# Patient Record
Sex: FEMALE | Race: WHITE | NOT HISPANIC OR LATINO | Employment: OTHER | URBAN - METROPOLITAN AREA
[De-identification: names, ages, dates, MRNs, and addresses within clinical notes are randomized per-mention and may not be internally consistent; named-entity substitution may affect disease eponyms.]

---

## 2023-09-25 ENCOUNTER — HOSPITAL ENCOUNTER (OUTPATIENT)
Dept: DATA CONVERSION | Facility: HOSPITAL | Age: 88
End: 2023-09-25
Attending: STUDENT IN AN ORGANIZED HEALTH CARE EDUCATION/TRAINING PROGRAM | Admitting: STUDENT IN AN ORGANIZED HEALTH CARE EDUCATION/TRAINING PROGRAM
Payer: COMMERCIAL

## 2023-09-25 DIAGNOSIS — C43.62 MALIGNANT MELANOMA OF LEFT UPPER LIMB, INCLUDING SHOULDER (MULTI): ICD-10-CM

## 2023-09-30 RX ORDER — LEVOTHYROXINE SODIUM 25 UG/1
25 TABLET ORAL
COMMUNITY
Start: 2022-10-05

## 2023-09-30 RX ORDER — OXYBUTYNIN CHLORIDE 5 MG/1
5 TABLET ORAL 2 TIMES DAILY
COMMUNITY
Start: 2023-09-05

## 2023-09-30 RX ORDER — ALENDRONATE SODIUM 70 MG/1
70 TABLET ORAL
COMMUNITY
Start: 2022-10-04

## 2023-09-30 RX ORDER — HYDROGEN PEROXIDE 3 %
20 SOLUTION, NON-ORAL MISCELLANEOUS DAILY
COMMUNITY
Start: 2022-12-21

## 2023-09-30 RX ORDER — ATORVASTATIN CALCIUM 20 MG/1
20 TABLET, FILM COATED ORAL DAILY
COMMUNITY
Start: 2023-09-05

## 2023-09-30 NOTE — H&P
History & Physical Reviewed:   I have reviewed the History and Physical dated:  25-Sep-2023   History and Physical reviewed and relevant findings noted. Patient examined to review pertinent physical  findings.: No significant changes   Home Medications Reviewed: no changes noted   Allergies Reviewed: no changes noted       ERAS (Enhanced Recovery After Surgery):  ·  ERAS Patient: no     Consent:   COVID-19 Consent:  ·  COVID-19 Risk Consent Surgeon has reviewed key risks related to the risk of lazara COVID-19 and if they contract COVID-19 what the risks are.       Electronic Signatures:  Romario Bell)  (Signed 25-Sep-2023 07:14)   Authored: History & Physical Reviewed, ERAS, Consent,  Note Completion      Last Updated: 25-Sep-2023 07:14 by Romario Bell)

## 2023-10-01 NOTE — OP NOTE
PROCEDURE DETAILS    Preoperative Diagnosis:  melanoma left arm  Postoperative Diagnosis:  melanoma left arm  Surgeon: Romario Bell  Resident/Fellow/Other Assistant: Zain Howard    Procedure:  1. WIDE LOCAL EXCISION LEFT ARM, SENTINEL NODE BIOPSY, NEOPROBE    Anesthesia: No anesthesiologist associated with this case  Estimated Blood Loss: 5  Findings: 3 lymph nodes  Specimens(s) Collected: yes,           Operative Report:   Indications for surgery: The patient was recently diagnosed with a 1.3 mm melanoma of the left upper arm.  This biopsy had positive margins and on physical  exam it was clearly a T4 lesion.  A preoperative PET scan was obtained and there was faint FDG activity in the left axillary basin but not convincing for metastatic disease.  After discussing the risks and benefits of wide local excision with sentinel  lymph node biopsy the patient elected to proceed.    Details of procedure: The patient arrived at [facility] on 9/25/2023 for the aforementioned procedure. Consent was obtained, history and physical performed, and questions were answered. The patient was moved into the operating room and  induced under anesthesia. A timeout was performed prior to surgery.    For the wide local excision, the left arm was prepped and draped in the usual sterile fashion. A 2 cm margin was drawn about the lesion and this was extended into a 3:1 elliptical incision along natural body lines to facilitate a tension-free closure.  Local anesthetic was injected and an incision was made along this ellipse. Electrocautery was used to dissected down to the muscular fascia and the specimen was then removed and marked with a short stitch superior and a long stitch lateral with a final  dimension of 6 x 16 cm. This was sent for permanent pathology. Circumferential soft tissue flaps were created to facilitate closure. The wound was irrigated and hemostasis was achieved. The wound was then closed  in a complex multilayer fashion using deep  2-0 Vicryl [id number], interrupted 3-0 Vicryl deep dermals, and a running 4-0 Prolene suture. The skin was dressed with Dermabond.    For the sentinel lymph node biopsy, the left axilla was prepped and draped in the usual sterile fashion after verifying radiotracer presence in this basin with the neoprobe. A 3 cm incision was made over the radioactivity and dissection was carried out  with electrocautery to identify the sentinel node. This was removed using clips and suture as needed to control blood vessels and lymphatics. A total of 3 lymph nodes was obtained and sent for permanent pathology. The wound was then irrigated and hemostasis  achieved. This was closed in a multilayer fashion using interrupted deep 3-0 Vicryl in a running subcuticular 4-0 Monocryl. The skin was dressed with Dermabond.    The patient was awoken and returned to the PACU in anticipation of discharge home. I was present scrubbed and directed all operative decision-making.  Note Recipients: [facility] Synoptic Op Report:  Primary Cutaneous Melanoma  Operation performed with curative intent: yes   1.3 mm  Clinical margin width: 2 cm   Depth of excision: full-thickness skin/subcutaneous tissue down to fascia (melanoma)                    Attestation:   Note Completion:  Attending Attestation I performed the procedure without a resident         Electronic Signatures:  Romario Bell)  (Signed 25-Sep-2023 11:57)   Authored: Post-Operative Note, Chart Review, Note Completion      Last Updated: 25-Sep-2023 11:57 by Romario Bell)

## 2023-10-02 LAB
ATRIAL RATE: 52 BPM
COMPLETE PATHOLOGY REPORT: NORMAL
CONVERTED ADDENDUM COMMENT 2: NORMAL
CONVERTED CLINICAL DIAGNOSIS-HISTORY: NORMAL
CONVERTED DIAGNOSIS COMMENT: NORMAL
CONVERTED FINAL DIAGNOSIS: NORMAL
CONVERTED FINAL REPORT PDF LINK TO COPY AND PASTE: NORMAL
CONVERTED GROSS DESCRIPTION: NORMAL
CONVERTED MICROSCOPIC DESCRIPTION: NORMAL
CONVERTED SYNOPTIC DIAGNOSIS: NORMAL
P AXIS: 50 DEGREES
P OFFSET: 158 MS
P ONSET: 104 MS
PR INTERVAL: 246 MS
Q ONSET: 227 MS
QRS COUNT: 9 BEATS
QRS DURATION: 70 MS
QT INTERVAL: 432 MS
QTC CALCULATION(BAZETT): 401 MS
QTC FREDERICIA: 411 MS
R AXIS: 4 DEGREES
T AXIS: 34 DEGREES
T OFFSET: 443 MS
VENTRICULAR RATE: 52 BPM

## 2023-10-04 DIAGNOSIS — T81.49XA WOUND INFECTION AFTER SURGERY: Primary | ICD-10-CM

## 2023-10-04 RX ORDER — CEPHALEXIN 500 MG/1
500 CAPSULE ORAL 2 TIMES DAILY
Qty: 28 CAPSULE | Refills: 0 | Status: SHIPPED | OUTPATIENT
Start: 2023-10-04 | End: 2023-10-12 | Stop reason: HOSPADM

## 2023-10-04 RX ORDER — DOXYCYCLINE 100 MG/1
100 CAPSULE ORAL 2 TIMES DAILY
Qty: 28 CAPSULE | Refills: 0 | Status: SHIPPED | OUTPATIENT
Start: 2023-10-04 | End: 2023-10-12 | Stop reason: HOSPADM

## 2023-10-05 ENCOUNTER — APPOINTMENT (OUTPATIENT)
Dept: DERMATOLOGY | Facility: CLINIC | Age: 88
End: 2023-10-05
Payer: COMMERCIAL

## 2023-10-08 DIAGNOSIS — C43.9 MELANOMA OF SKIN (MULTI): Primary | ICD-10-CM

## 2023-10-08 NOTE — TUMOR BOARD NOTE
General Patient Information  Name:  Nayely Gr  Evaluation #:  2  Conference Date:  10-9-2023  YOB: 1935  MRN:  [id number]  Program Physician(s):  Tamir Hudson  Referring Physician(s):  Romario Kincaid      Summary   Stage:  pIIID ([id number])   Melanoma 5 year survival: 32%    Assessment:  Left upper arm with a non-ulcerated primary dermal melanoma, Breslow: at least 1.3 mm, broadly transected on the deep margin; the lack of epidermal attachment raises the possibility of a metastatic or recurrent melanoma.  PET/CT withmildly hypermetabolic left axillary lymphadenopathy concerning for ansley disease involvement.  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    Recommendation:  Imaging to rule out metastatic disease. Consider complete lymph node dissection vs. close follow-up with serial ultrasounds. Refer to medical oncology.    Review Multidisciplinary Cutaneous Oncology Conference recommendation with patient.  Continue routine follow up and total body skin exams with [facility].    Follow Up:  Romario Kincaid      History and Physical Exam  Dermatologic History:   88 y.o. female with a biopsy of the left upper arm on 8- showing a non-ulcerated melanoma, primary dermal type, Breslow: at least 1.3 mm, broadly transected on the deep margin; the lack of epidermal attachment raises the possibility of a metastatic or recurrent melanoma.  PET/CT on 9- showing an intensely hypermetabolic soft tissue lesion involving the anterior left arm consistent with a neoplastic process, mildly hypermetabolic left axillary lymphadenopathy concerning for ansley disease involvement, and no evidence of additional focal hypermetabolic lesion to suggest  additional neoplastic foci.  S/p on 9- WLE with 2 cm margins showing an ulcerated malignant melanoma, primary dermal vs nodular type, Breslow thickness 9.5 mm with in transit metastasis, inked margins free in planes of sections examined, and left axillary SLNB showing focal SOX-10 positive staining, greatest deposit size of 0.02 mm (2/3).    Past Medical History:  PAD (previously on plavix for stent in left leg)  HLD  Osteoporosis  GERD  Hypothyroid    Family History of DNS/MM:  Unknown    Skin:  1-2 cm fungating moist lesion half pigmented left upper arm    Lymphatic:  No lymphadenopathy      Pathology  Name JAIDEN PATEL                                                                                                   Accession #: [id number]            Pathologist:                   ASHLEIGH OSBORN MD  Date of Procedure:    9/25/2023  Date Received:          9/26/2023  Date Reported           10/2/2023  Submitting Physician:   VANESSA GILL MD  Location:                    [facility]  Other External #    Procedures/Addenda Present                                                               FINAL DIAGNOSIS  A.  NODE, LEFT AXILLARY SENTINEL LYMPH NODE #1, BIOPSY:  FOCAL SOX-10 POSITIVE STAINING, SEE NOTE.    Note: Microscopic examination reveals a lymph node. In the subcapsular space  there are two couplets of SOX-10 positive cells are not seen on the Melan-A or  HMB45 stain. All control slides stain appropriately. The primary melanoma in  [id number] was reviewed and the cytology is similar. A metastatic melanoma in 1/1  lymph node without extracapsular extension and greatest deposit size of 0.02 mm  is favored.      B.  NODE, LEFT AXILLARY SENTINEL LYMPH NODE #2, BIOPSY:  FOCAL SOX-10 POSITIVE STAINING, SEE NOTE.    Note: Microscopic examination reveals an occasional SOX-10 positive cell in the  subcapsular space compared to the primary melanoma in [id number]. They are not  seen on the  Melan-A or HMB45 stain. All control slides stain appropriately. The  findings are suspicious for metastatic malignant melanoma in 1/1 lymph node  with a greatest deposit size of 0.01 mm.      C.  NODE, LEFT AXILLARY SENTINEL LYMPH NODE #3, BIOPSY:  BENIGN LYMPH NODE.    D.  SKIN, WIDE LOCAL EXCISION LEFT UPPER ARM 6x16CM:  ULCERATED MALIGNANT MELANOMA, BRESLOW THICKNESS 9.5 MM WITH IN TRANSIT  METASTASIS, INKED MARGINS FREE IN PLANES OF SECTIONS EXAMINED, SEE NOTE.    Note: Microscopic examination reveals a specimen that extends into the  subcutaneous fat. There is an ulcer and there are atypical epithelioid and  spindled cells in the dermis. In slide D15 away from the main tumor body and in  slide D22 in the subcutaneous fat there are atypical spindled cells. The tumor  is present in slides D12 through 19 predominantly. In slide D22 the atypical  spindle cells stain with antibodies against SOX-10. All control slides stain  appropriately.                  **Electronically Signed Out by ASHLEIGH OSBORN M.D.**                                                                                                                                    CASE SUMMARY REPORT  D.  SKIN, WIDE LOCAL EXCISION LEFT UPPER ARM 6x16CM:       SPECIMEN  Procedure:      Re-excision       [place] node(s) biopsy  Specimen Laterality:      Left  TUMOR  Tumor Site:      Skin of upper limb and shoulder: Left upper arm   Histologic Type:         Primary dermal vs Nodular   Maximum Tumor (Breslow) Thickness (Millimeters):         9.5 mm   Macroscopic Satellite Nodule(s):        Not identified   Ulceration:        Present          Extent of Ulceration (Millimeters): 15 mm   Anatomic (Aquiles) Level:        V (Melanoma invades subcutis)   Mitotic Rate:         15 mitoses per mm2   Microsatellite(s):        Present   Lymphovascular Invasion:        Not identified   Neurotropism:        Not identified   Tumor-Infiltrating Lymphocytes:        Present,  nonbrisk   Tumor Regression:        Not identified  MARGINS   Margin Status for Invasive Melanoma:        All margins negative for invasive  melanoma          Closest Margin Location(s) to Invasive Melanoma: Medial     Distance from Invasive Melanoma to Peripheral Margin:            8 mm     Distance from Invasive Melanoma to Deep Margin:           Cannot be determined: The fat had to be shaved off of the epidermis  and dermis due to the thickness of the specimen.  REGIONAL LYMPH NODES   Regional Lymph Node Status:          Tumor present in regional lymph node(s)       Total Number of Lymph Nodes with Tumor:             2       Number of [place] Lymph Nodes with Tumor:             2       [place] Site(s) with Tumor:            Intramedullary       Size of Largest [place] Node Metastatic Deposit:              0.02 mm       Size of Largest [place] Metastatic Deposit:             0.02 mm       Extranodal Extension:            Present       Matted Nodes:            Not identified     Total Number of Lymph Nodes Examined:           3     Number of [place] Nodes Examined:           3  DISTANT METASTASIS  PATHOLOGIC STAGE CLASSIFICATION (pTNM, AJCC 8th Edition)  Reporting of pT, pN, and (when applicable) pM categories is based on  information available to the pathologist at the time the report is issued. As  per the AJCC (Chapter 1, 8th Ed.) it is the managing physician's responsibility  to establish the final pathologic stage based upon all pertinent information,  including but potentially not limited to this pathology report.   pT Category:        pT4b   pN Category:        pN2c  ADDITIONAL FINDINGS  Additional Findings:       None  ADDITIONAL TESTING  Representative Blocks:      Normal Block: Not applicable       Tumor Block: D12 through 19                                                                                 Electronically Signed Out By ASHLEIGH OSBORN MD/[facility]  Addendum/Procedures:  Addendum     Date Ordered:      10/4/2023     Status:  Signed Out       Date Complete:     10/4/2023             Date Reported:     10/4/2023                 Addendum Comment  The atypical spindle cells do not stain with antibodies against CD31. All  control slides stain appropriately.  Electronically Signed Out By ASHLEIGH OSBORN MD/[facility]       Microscopic Description:  C. Microscopic examination reveals a lymph node with normal architecture.  No  melanoma is seen on H and E staining.  Melan-A, SOX-10, and HMB45 stains are  unremarkable. All control slides stain appropriately.        ___________________________________________________________________________________________________    Report Status: Final  Name: JAIDEN PATEL  Accession #: [id number]  Pathologist: ASHLEIGH OSBORN MD  Date of Procedure: 8/28/2023  Date Received: 8/28/2023  Date Reported 8/29/2023  Submitting Physician: VANESSA GILL MD  Location: [facility]  Copy To/Referring/Attending:  MD PABLTIO FRANCOIS      FINAL DIAGNOSIS  4 SLIDES, [facility] #[id number] [C] (BX:  08/15/2023)    A. SKIN, RIGHT CHEEK:  SLIDES NOT RECEIVED    B. SKIN, RIGHT ALAR CREASE:  SLIDES NOT RECEIVED    C. SKIN, LEFT UPPER ARM, SHAVE BIOPSY:    MALIGNANT MELANOMA, BRESLOW THICKNESS AT LEAST 1.3 MM, PRESENT ON THE DEEP AND  PERIPHERAL MARGIN, SEE NOTE.    Note: Microscopic examination reveals a specimen that extends into the mid  reticular dermis. There are severely atypical epithelioid cells with occasional  multinucleate cells in the dermis. They do not stain with antibodies against  CK5/6 or CK SOLEDAD. They stain strongly with antibodies against SOX-10.    The lack of epidermal attachment raises the possibility of a metastatic or  recurrent melanoma. If it is a primary melanoma it would have features as  outlined in the synoptic report.    **Electronically Signed Out by KORD S. HONDA, M.D.**      CANCER SUMMARY REPORT  A. 4 SLIDES, [facility] #[id number] [C] (BX:  08/15/2023):    SPECIMEN  Procedure: Biopsy, shave  Specimen Laterality: Left    TUMOR  Tumor Site: Skin of upper limb and shoulder: Left upper arm  Histologic Type: Primary dermal  Maximum Tumor (Breslow) Thickness (Millimeters): At least: 1.3 mm  Broadly transected on the deep margin.  Ulceration: Not identified  Anatomic (Aquiles) Level: At least level: IV  Broadly transected on the deep margin.  Mitotic Rate: 3 mitoses per mm2  Microsatellite(s): Not identified  Lymphovascular Invasion: Not identified  Neurotropism: Not identified  Tumor-Infiltrating Lymphocytes: Present, nonbrisk  Tumor Regression: Not identified    MARGINS  Margin Status for Invasive Melanoma: Invasive melanoma present at  margin  Margin(s) Involved by Invasive Melanoma: Peripheral  Deep: At the periphery.    PATHOLOGIC STAGE CLASSIFICATION (pTNM, AJCC 8th Edition)  pT Category: pT2a    ADDITIONAL FINDINGS  Additional Findings: None    ADDITIONAL TESTING  Representative Blocks: Normal Block: None  Tumor Block: C1    Electronically Signed Out By ASHLEIGH OSBORN MD/[facility]      Radiology  Interpreted By:  LEILANI PARIS MD and KALPESH PRINCE MD  MRN: [id number]  Patient Name: JAIDEN PATEL     STUDY:  PET/CT MELANOMA INITIAL STAGING;  9/20/2023 11:21 am     INDICATION:  Patient with malignant mnelanoma of left arm needs whole body PET due  to pathology report finding this concerning for metastatic lesion and  high risk stage 2- possible stage 3 melanoma pt will likely need  adjuvant therapy need to access further  C43.62: Malignant melanoma  of left upper arm.     COMPARISON:  None.     ACCESSION NUMBER(S):  [id number]     ORDERING CLINICIAN:  VANESSA GILL     TECHNIQUE:  DIVISION OF NUCLEAR MEDICINE  POSITRON EMISSION TOMOGRAPHY (PET-CT)     The patient received an intravenous dose of 12.7 mCi of Fluorine-18  fluorodeoxyglucose (FDG).  The patient was placed in a dark quiet  room. Positron emission tomographic (PET)  images from skull vertex to  the feet were then acquired after a one hour delay. Also acquired was  a contemporaneous low dose non-contrast CT scan performed for  attenuation correction of PET images and anatomic localization.  The  PET and CT images were digitally fused for display.  All images were  acquired on a combined PET-CT scanner unit.  Some areas of FDG  accumulation may be described in standardized uptake value (SUV)  units.     CODING:  Initial Treatment Strategy (PI)     CALIBRATION:  Dose Injection-to-Scan Interval (mins): 58 min  Mediastinal bloodpool SUV (normal 1.5-2.5): 1.7  Blood glucose: 119 mg/dL     FINDINGS:  HEAD AND NECK:  No evidence of focal hypermetabolic lesion in the brain parenchyma,  noting that evaluation is limited because of the expected physiologic  diffuse FDG uptake in the brain.  No focal hypermetabolic soft tissue lesion is seen in the neck.  No hypermetabolic cervical lymphadenopathy is present.     CHEST:  No focal hypermetabolic lesion is seen in the lung parenchyma. There  are multiple calcified lung nodules throughout the lungs bilaterally  most notable within the left perihilar and right apical lung without  increased metabolic activity.  There are two mildly hypermetabolic left axillary lymph nodes with  maximum SUV of 1.8. No additional intrathoracic or axillary  hypermetabolic lymphadenopathy.     ABDOMEN AND PELVIS:  No hypermetabolic soft tissue lesion is present in the abdomen and  pelvis. There is a photopenic lesion within hepatic segment 7 which  measures up to 1.9 cm which is incompletely characterized on current  examination but favored to represent benign process such as hepatic  cyst or hemangioma.  No evidence of hypermetabolic lymphadenopathy.  Physiologic radiotracer uptake is present in the liver and spleen  with excretion into the bowel loops and the genitourinary tract.     MUSCULOSKELETAL/EXTREMITIES:  No focal hypermetabolic lesion is seen in the  axial or appendicular  skeleton to suggest osseous metastasis.  Soft tissue lesion involving the cutaneous and subcutaneous anterior  left arm with maximum SUV of 11.0. No additional hypermetabolic soft  tissue lesions.     IMPRESSION:  1.  Intensely hypermetabolic soft tissue lesion involving the  anterior left arm consistent with a neoplastic process.  2. Mildly hypermetabolic left axillary lymphadenopathy concerning for  ansley disease involvement.  3. No evidence of additional focal hypermetabolic lesion to suggest  additional neoplastic foci.

## 2023-10-09 ENCOUNTER — HOSPITAL ENCOUNTER (OUTPATIENT)
Facility: HOSPITAL | Age: 88
Discharge: HOME | DRG: 603 | End: 2023-10-12
Attending: EMERGENCY MEDICINE | Admitting: INTERNAL MEDICINE
Payer: COMMERCIAL

## 2023-10-09 ENCOUNTER — TUMOR BOARD CONFERENCE (OUTPATIENT)
Dept: HEMATOLOGY/ONCOLOGY | Facility: HOSPITAL | Age: 88
End: 2023-10-09
Payer: COMMERCIAL

## 2023-10-09 DIAGNOSIS — T81.31XA DEHISCENCE OF OPERATIVE WOUND, INITIAL ENCOUNTER: ICD-10-CM

## 2023-10-09 DIAGNOSIS — L03.90 CELLULITIS: Primary | ICD-10-CM

## 2023-10-09 DIAGNOSIS — L03.114 CELLULITIS OF LEFT UPPER EXTREMITY: ICD-10-CM

## 2023-10-09 PROCEDURE — 87040 BLOOD CULTURE FOR BACTERIA: CPT | Mod: STJLAB

## 2023-10-09 PROCEDURE — 36415 COLL VENOUS BLD VENIPUNCTURE: CPT | Mod: STJLAB

## 2023-10-09 PROCEDURE — 36415 COLL VENOUS BLD VENIPUNCTURE: CPT

## 2023-10-09 PROCEDURE — 2500000004 HC RX 250 GENERAL PHARMACY W/ HCPCS (ALT 636 FOR OP/ED)

## 2023-10-09 PROCEDURE — 99285 EMERGENCY DEPT VISIT HI MDM: CPT | Performed by: EMERGENCY MEDICINE

## 2023-10-09 PROCEDURE — 87040 BLOOD CULTURE FOR BACTERIA: CPT

## 2023-10-09 PROCEDURE — 99285 EMERGENCY DEPT VISIT HI MDM: CPT | Mod: 25 | Performed by: EMERGENCY MEDICINE

## 2023-10-09 PROCEDURE — 87075 CULTR BACTERIA EXCEPT BLOOD: CPT

## 2023-10-09 RX ORDER — VANCOMYCIN HYDROCHLORIDE 1 G/200ML
1000 INJECTION, SOLUTION INTRAVENOUS ONCE
Status: COMPLETED | OUTPATIENT
Start: 2023-10-09 | End: 2023-10-10

## 2023-10-09 RX ADMIN — VANCOMYCIN HYDROCHLORIDE 1000 MG: 1 INJECTION, SOLUTION INTRAVENOUS at 23:25

## 2023-10-09 ASSESSMENT — LIFESTYLE VARIABLES
HAVE PEOPLE ANNOYED YOU BY CRITICIZING YOUR DRINKING: NO
EVER HAD A DRINK FIRST THING IN THE MORNING TO STEADY YOUR NERVES TO GET RID OF A HANGOVER: NO
EVER FELT BAD OR GUILTY ABOUT YOUR DRINKING: NO
HAVE YOU EVER FELT YOU SHOULD CUT DOWN ON YOUR DRINKING: NO

## 2023-10-09 ASSESSMENT — PAIN SCALES - GENERAL: PAINLEVEL_OUTOF10: 0 - NO PAIN

## 2023-10-09 ASSESSMENT — COLUMBIA-SUICIDE SEVERITY RATING SCALE - C-SSRS
6. HAVE YOU EVER DONE ANYTHING, STARTED TO DO ANYTHING, OR PREPARED TO DO ANYTHING TO END YOUR LIFE?: NO
2. HAVE YOU ACTUALLY HAD ANY THOUGHTS OF KILLING YOURSELF?: NO
1. IN THE PAST MONTH, HAVE YOU WISHED YOU WERE DEAD OR WISHED YOU COULD GO TO SLEEP AND NOT WAKE UP?: NO

## 2023-10-09 ASSESSMENT — PAIN - FUNCTIONAL ASSESSMENT: PAIN_FUNCTIONAL_ASSESSMENT: 0-10

## 2023-10-10 ENCOUNTER — APPOINTMENT (OUTPATIENT)
Dept: SURGICAL ONCOLOGY | Facility: CLINIC | Age: 88
End: 2023-10-10
Payer: COMMERCIAL

## 2023-10-10 PROBLEM — L03.90 CELLULITIS: Status: ACTIVE | Noted: 2023-10-10

## 2023-10-10 PROBLEM — E87.6 HYPOKALEMIA: Status: ACTIVE | Noted: 2023-10-10

## 2023-10-10 PROBLEM — L03.90 CELLULITIS: Status: RESOLVED | Noted: 2023-10-10 | Resolved: 2023-10-10

## 2023-10-10 PROBLEM — T81.31XA DEHISCENCE OF INCISION: Status: ACTIVE | Noted: 2023-10-10

## 2023-10-10 PROBLEM — L03.114 CELLULITIS OF LEFT UPPER EXTREMITY: Status: ACTIVE | Noted: 2023-10-10

## 2023-10-10 PROBLEM — L03.90 CELLULITIS: Chronic | Status: RESOLVED | Noted: 2023-10-10 | Resolved: 2023-10-10

## 2023-10-10 PROBLEM — C43.9 MELANOMA (MULTI): Status: ACTIVE | Noted: 2023-10-10

## 2023-10-10 LAB
ALBUMIN SERPL BCP-MCNC: 4 G/DL (ref 3.4–5)
ALP SERPL-CCNC: 63 U/L (ref 33–136)
ALT SERPL W P-5'-P-CCNC: 7 U/L (ref 7–45)
ANION GAP SERPL CALC-SCNC: 13 MMOL/L (ref 10–20)
ANION GAP SERPL CALC-SCNC: 14 MMOL/L (ref 10–20)
AST SERPL W P-5'-P-CCNC: 11 U/L (ref 9–39)
BILIRUB SERPL-MCNC: 0.5 MG/DL (ref 0–1.2)
BUN SERPL-MCNC: 12 MG/DL (ref 6–23)
BUN SERPL-MCNC: 17 MG/DL (ref 6–23)
CALCIUM SERPL-MCNC: 9.1 MG/DL (ref 8.6–10.3)
CALCIUM SERPL-MCNC: 9.5 MG/DL (ref 8.6–10.3)
CHLORIDE SERPL-SCNC: 104 MMOL/L (ref 98–107)
CHLORIDE SERPL-SCNC: 106 MMOL/L (ref 98–107)
CO2 SERPL-SCNC: 23 MMOL/L (ref 21–32)
CO2 SERPL-SCNC: 25 MMOL/L (ref 21–32)
CREAT SERPL-MCNC: 0.56 MG/DL (ref 0.5–1.05)
CREAT SERPL-MCNC: 0.82 MG/DL (ref 0.5–1.05)
ERYTHROCYTE [DISTWIDTH] IN BLOOD BY AUTOMATED COUNT: 12.8 % (ref 11.5–14.5)
ERYTHROCYTE [DISTWIDTH] IN BLOOD BY AUTOMATED COUNT: 12.9 % (ref 11.5–14.5)
GFR SERPL CREATININE-BSD FRML MDRD: 69 ML/MIN/1.73M*2
GFR SERPL CREATININE-BSD FRML MDRD: 88 ML/MIN/1.73M*2
GLUCOSE SERPL-MCNC: 108 MG/DL (ref 74–99)
GLUCOSE SERPL-MCNC: 140 MG/DL (ref 74–99)
HCT VFR BLD AUTO: 38.3 % (ref 36–46)
HCT VFR BLD AUTO: 38.7 % (ref 36–46)
HGB BLD-MCNC: 12.2 G/DL (ref 12–16)
HGB BLD-MCNC: 12.3 G/DL (ref 12–16)
MAGNESIUM SERPL-MCNC: 2.24 MG/DL (ref 1.6–2.4)
MCH RBC QN AUTO: 29.8 PG (ref 26–34)
MCH RBC QN AUTO: 30 PG (ref 26–34)
MCHC RBC AUTO-ENTMCNC: 31.5 G/DL (ref 32–36)
MCHC RBC AUTO-ENTMCNC: 32.1 G/DL (ref 32–36)
MCV RBC AUTO: 93 FL (ref 80–100)
MCV RBC AUTO: 95 FL (ref 80–100)
NRBC BLD-RTO: 0 /100 WBCS (ref 0–0)
NRBC BLD-RTO: 0 /100 WBCS (ref 0–0)
PHOSPHATE SERPL-MCNC: 3.4 MG/DL (ref 2.5–4.9)
PLATELET # BLD AUTO: 243 X10*3/UL (ref 150–450)
PLATELET # BLD AUTO: 256 X10*3/UL (ref 150–450)
PMV BLD AUTO: 10 FL (ref 7.5–11.5)
PMV BLD AUTO: 10.5 FL (ref 7.5–11.5)
POTASSIUM SERPL-SCNC: 3.5 MMOL/L (ref 3.5–5.3)
POTASSIUM SERPL-SCNC: 4.1 MMOL/L (ref 3.5–5.3)
PROT SERPL-MCNC: 7.3 G/DL (ref 6.4–8.2)
RBC # BLD AUTO: 4.07 X10*6/UL (ref 4–5.2)
RBC # BLD AUTO: 4.13 X10*6/UL (ref 4–5.2)
SODIUM SERPL-SCNC: 138 MMOL/L (ref 136–145)
SODIUM SERPL-SCNC: 139 MMOL/L (ref 136–145)
WBC # BLD AUTO: 10.9 X10*3/UL (ref 4.4–11.3)
WBC # BLD AUTO: 8.1 X10*3/UL (ref 4.4–11.3)

## 2023-10-10 PROCEDURE — 87040 BLOOD CULTURE FOR BACTERIA: CPT | Mod: STJLAB | Performed by: NURSE PRACTITIONER

## 2023-10-10 PROCEDURE — 84100 ASSAY OF PHOSPHORUS: CPT | Performed by: NURSE PRACTITIONER

## 2023-10-10 PROCEDURE — 36415 COLL VENOUS BLD VENIPUNCTURE: CPT | Performed by: NURSE PRACTITIONER

## 2023-10-10 PROCEDURE — 87186 SC STD MICRODIL/AGAR DIL: CPT | Performed by: NURSE PRACTITIONER

## 2023-10-10 PROCEDURE — 80048 BASIC METABOLIC PNL TOTAL CA: CPT | Performed by: NURSE PRACTITIONER

## 2023-10-10 PROCEDURE — 87075 CULTR BACTERIA EXCEPT BLOOD: CPT | Mod: 59,STJLAB | Performed by: NURSE PRACTITIONER

## 2023-10-10 PROCEDURE — 85027 COMPLETE CBC AUTOMATED: CPT

## 2023-10-10 PROCEDURE — 36415 COLL VENOUS BLD VENIPUNCTURE: CPT

## 2023-10-10 PROCEDURE — 96372 THER/PROPH/DIAG INJ SC/IM: CPT | Performed by: NURSE PRACTITIONER

## 2023-10-10 PROCEDURE — 99222 1ST HOSP IP/OBS MODERATE 55: CPT | Performed by: NURSE PRACTITIONER

## 2023-10-10 PROCEDURE — 1100000001 HC PRIVATE ROOM DAILY

## 2023-10-10 PROCEDURE — 83735 ASSAY OF MAGNESIUM: CPT | Performed by: NURSE PRACTITIONER

## 2023-10-10 PROCEDURE — 85027 COMPLETE CBC AUTOMATED: CPT | Performed by: NURSE PRACTITIONER

## 2023-10-10 PROCEDURE — 2500000001 HC RX 250 WO HCPCS SELF ADMINISTERED DRUGS (ALT 637 FOR MEDICARE OP): Performed by: NURSE PRACTITIONER

## 2023-10-10 PROCEDURE — 2500000004 HC RX 250 GENERAL PHARMACY W/ HCPCS (ALT 636 FOR OP/ED): Performed by: STUDENT IN AN ORGANIZED HEALTH CARE EDUCATION/TRAINING PROGRAM

## 2023-10-10 PROCEDURE — 2500000004 HC RX 250 GENERAL PHARMACY W/ HCPCS (ALT 636 FOR OP/ED): Performed by: NURSE PRACTITIONER

## 2023-10-10 PROCEDURE — 87075 CULTR BACTERIA EXCEPT BLOOD: CPT | Performed by: NURSE PRACTITIONER

## 2023-10-10 PROCEDURE — 80053 COMPREHEN METABOLIC PANEL: CPT

## 2023-10-10 RX ORDER — TALC
3 POWDER (GRAM) TOPICAL DAILY PRN
Status: DISCONTINUED | OUTPATIENT
Start: 2023-10-10 | End: 2023-10-12 | Stop reason: HOSPADM

## 2023-10-10 RX ORDER — POTASSIUM CHLORIDE 1.5 G/1.58G
40 POWDER, FOR SOLUTION ORAL ONCE
Status: COMPLETED | OUTPATIENT
Start: 2023-10-10 | End: 2023-10-10

## 2023-10-10 RX ORDER — HEPARIN SODIUM 5000 [USP'U]/ML
5000 INJECTION, SOLUTION INTRAVENOUS; SUBCUTANEOUS EVERY 8 HOURS
Status: DISCONTINUED | OUTPATIENT
Start: 2023-10-10 | End: 2023-10-12 | Stop reason: HOSPADM

## 2023-10-10 RX ORDER — ACETAMINOPHEN 325 MG/1
650 TABLET ORAL EVERY 6 HOURS PRN
Status: DISCONTINUED | OUTPATIENT
Start: 2023-10-10 | End: 2023-10-12 | Stop reason: HOSPADM

## 2023-10-10 RX ORDER — LEVOTHYROXINE SODIUM 25 UG/1
25 TABLET ORAL
Status: DISCONTINUED | OUTPATIENT
Start: 2023-10-10 | End: 2023-10-12 | Stop reason: HOSPADM

## 2023-10-10 RX ORDER — PANTOPRAZOLE SODIUM 20 MG/1
20 TABLET, DELAYED RELEASE ORAL
Status: DISCONTINUED | OUTPATIENT
Start: 2023-10-10 | End: 2023-10-12 | Stop reason: HOSPADM

## 2023-10-10 RX ORDER — ATORVASTATIN CALCIUM 20 MG/1
20 TABLET, FILM COATED ORAL DAILY
Status: DISCONTINUED | OUTPATIENT
Start: 2023-10-10 | End: 2023-10-12 | Stop reason: HOSPADM

## 2023-10-10 RX ORDER — POLYETHYLENE GLYCOL 3350 17 G/17G
17 POWDER, FOR SOLUTION ORAL DAILY PRN
Status: DISCONTINUED | OUTPATIENT
Start: 2023-10-10 | End: 2023-10-12 | Stop reason: HOSPADM

## 2023-10-10 RX ORDER — ONDANSETRON HYDROCHLORIDE 2 MG/ML
4 INJECTION, SOLUTION INTRAVENOUS ONCE
Status: COMPLETED | OUTPATIENT
Start: 2023-10-10 | End: 2023-10-10

## 2023-10-10 RX ORDER — OXYBUTYNIN CHLORIDE 5 MG/1
5 TABLET ORAL 2 TIMES DAILY
Status: DISCONTINUED | OUTPATIENT
Start: 2023-10-10 | End: 2023-10-12 | Stop reason: HOSPADM

## 2023-10-10 RX ADMIN — ACETAMINOPHEN 650 MG: 325 TABLET ORAL at 03:34

## 2023-10-10 RX ADMIN — OXYBUTYNIN CHLORIDE 5 MG: 5 TABLET ORAL at 03:35

## 2023-10-10 RX ADMIN — PIPERACILLIN SODIUM AND TAZOBACTAM SODIUM 3.38 G: 3; .375 INJECTION, SOLUTION INTRAVENOUS at 10:37

## 2023-10-10 RX ADMIN — PIPERACILLIN SODIUM AND TAZOBACTAM SODIUM 3.38 G: 3; .375 INJECTION, SOLUTION INTRAVENOUS at 18:20

## 2023-10-10 RX ADMIN — Medication 3 MG: at 03:34

## 2023-10-10 RX ADMIN — OXYBUTYNIN CHLORIDE 5 MG: 5 TABLET ORAL at 10:36

## 2023-10-10 RX ADMIN — PIPERACILLIN SODIUM AND TAZOBACTAM SODIUM 3.38 G: 3; .375 INJECTION, SOLUTION INTRAVENOUS at 03:37

## 2023-10-10 RX ADMIN — ATORVASTATIN CALCIUM 20 MG: 20 TABLET, FILM COATED ORAL at 10:36

## 2023-10-10 RX ADMIN — HEPARIN SODIUM 5000 UNITS: 5000 INJECTION INTRAVENOUS; SUBCUTANEOUS at 10:37

## 2023-10-10 RX ADMIN — LEVOTHYROXINE SODIUM 25 MCG: 25 TABLET ORAL at 06:27

## 2023-10-10 RX ADMIN — HEPARIN SODIUM 5000 UNITS: 5000 INJECTION INTRAVENOUS; SUBCUTANEOUS at 03:44

## 2023-10-10 RX ADMIN — PANTOPRAZOLE SODIUM 20 MG: 20 TABLET, DELAYED RELEASE ORAL at 06:27

## 2023-10-10 RX ADMIN — ONDANSETRON 4 MG: 2 INJECTION INTRAMUSCULAR; INTRAVENOUS at 04:49

## 2023-10-10 RX ADMIN — Medication 1250 MG: at 23:30

## 2023-10-10 RX ADMIN — OXYBUTYNIN CHLORIDE 5 MG: 5 TABLET ORAL at 23:15

## 2023-10-10 RX ADMIN — POTASSIUM CHLORIDE 40 MEQ: 1.5 POWDER, FOR SOLUTION ORAL at 03:40

## 2023-10-10 SDOH — SOCIAL STABILITY: SOCIAL INSECURITY: HAS ANYONE EVER THREATENED TO HURT YOUR FAMILY OR YOUR PETS?: NO

## 2023-10-10 SDOH — SOCIAL STABILITY: SOCIAL INSECURITY: ARE YOU OR HAVE YOU BEEN THREATENED OR ABUSED PHYSICALLY, EMOTIONALLY, OR SEXUALLY BY ANYONE?: NO

## 2023-10-10 SDOH — SOCIAL STABILITY: SOCIAL INSECURITY: DO YOU FEEL UNSAFE GOING BACK TO THE PLACE WHERE YOU ARE LIVING?: NO

## 2023-10-10 SDOH — HEALTH STABILITY: PHYSICAL HEALTH: ON AVERAGE, HOW MANY MINUTES DO YOU ENGAGE IN EXERCISE AT THIS LEVEL?: 20 MIN

## 2023-10-10 SDOH — SOCIAL STABILITY: SOCIAL INSECURITY: WERE YOU ABLE TO COMPLETE ALL THE BEHAVIORAL HEALTH SCREENINGS?: YES

## 2023-10-10 SDOH — SOCIAL STABILITY: SOCIAL INSECURITY: DOES ANYONE TRY TO KEEP YOU FROM HAVING/CONTACTING OTHER FRIENDS OR DOING THINGS OUTSIDE YOUR HOME?: NO

## 2023-10-10 SDOH — SOCIAL STABILITY: SOCIAL INSECURITY: DO YOU FEEL ANYONE HAS EXPLOITED OR TAKEN ADVANTAGE OF YOU FINANCIALLY OR OF YOUR PERSONAL PROPERTY?: NO

## 2023-10-10 SDOH — SOCIAL STABILITY: SOCIAL INSECURITY: ABUSE: ADULT

## 2023-10-10 SDOH — HEALTH STABILITY: PHYSICAL HEALTH: ON AVERAGE, HOW MANY DAYS PER WEEK DO YOU ENGAGE IN MODERATE TO STRENUOUS EXERCISE (LIKE A BRISK WALK)?: 0 DAYS

## 2023-10-10 SDOH — SOCIAL STABILITY: SOCIAL INSECURITY: HAVE YOU HAD THOUGHTS OF HARMING ANYONE ELSE?: NO

## 2023-10-10 SDOH — SOCIAL STABILITY: SOCIAL INSECURITY: ARE THERE ANY APPARENT SIGNS OF INJURIES/BEHAVIORS THAT COULD BE RELATED TO ABUSE/NEGLECT?: NO

## 2023-10-10 ASSESSMENT — COGNITIVE AND FUNCTIONAL STATUS - GENERAL
MOBILITY SCORE: 18
DAILY ACTIVITIY SCORE: 18
DRESSING REGULAR UPPER BODY CLOTHING: A LITTLE
WALKING IN HOSPITAL ROOM: A LITTLE
TOILETING: A LITTLE
EATING MEALS: A LITTLE
TURNING FROM BACK TO SIDE WHILE IN FLAT BAD: A LITTLE
CLIMB 3 TO 5 STEPS WITH RAILING: A LITTLE
PERSONAL GROOMING: A LITTLE
DRESSING REGULAR LOWER BODY CLOTHING: A LITTLE
CLIMB 3 TO 5 STEPS WITH RAILING: A LITTLE
MOBILITY SCORE: 23
PATIENT BASELINE BEDBOUND: NO
HELP NEEDED FOR BATHING: A LITTLE
STANDING UP FROM CHAIR USING ARMS: A LITTLE
MOVING TO AND FROM BED TO CHAIR: A LITTLE
MOVING FROM LYING ON BACK TO SITTING ON SIDE OF FLAT BED WITH BEDRAILS: A LITTLE
DAILY ACTIVITIY SCORE: 24

## 2023-10-10 ASSESSMENT — ACTIVITIES OF DAILY LIVING (ADL)
TOILETING: INDEPENDENT
ASSISTIVE_DEVICE: WALKER
WALKS IN HOME: INDEPENDENT
JUDGMENT_ADEQUATE_SAFELY_COMPLETE_DAILY_ACTIVITIES: NO
HEARING - RIGHT EAR: FUNCTIONAL
BATHING: INDEPENDENT
FEEDING YOURSELF: INDEPENDENT
GROOMING: INDEPENDENT
DRESSING YOURSELF: INDEPENDENT
ADEQUATE_TO_COMPLETE_ADL: NO
PATIENT'S MEMORY ADEQUATE TO SAFELY COMPLETE DAILY ACTIVITIES?: NO
HEARING - LEFT EAR: FUNCTIONAL

## 2023-10-10 ASSESSMENT — LIFESTYLE VARIABLES
HOW OFTEN DO YOU HAVE 6 OR MORE DRINKS ON ONE OCCASION: NEVER
AUDIT-C TOTAL SCORE: 1
HOW MANY STANDARD DRINKS CONTAINING ALCOHOL DO YOU HAVE ON A TYPICAL DAY: 1 OR 2
AUDIT-C TOTAL SCORE: 1
PRESCIPTION_ABUSE_PAST_12_MONTHS: NO
SUBSTANCE_ABUSE_PAST_12_MONTHS: NO
HOW OFTEN DO YOU HAVE A DRINK CONTAINING ALCOHOL: MONTHLY OR LESS
SKIP TO QUESTIONS 9-10: 1

## 2023-10-10 ASSESSMENT — ENCOUNTER SYMPTOMS
FACIAL SWELLING: 0
BACK PAIN: 0
WEAKNESS: 0
TROUBLE SWALLOWING: 0
HYPERACTIVE: 0
JOINT SWELLING: 0
BLOOD IN STOOL: 0
ABDOMINAL PAIN: 0
DIAPHORESIS: 0
DIARRHEA: 0
FACIAL ASYMMETRY: 0
STRIDOR: 0
TREMORS: 0
POLYDIPSIA: 0
PHOTOPHOBIA: 0
FATIGUE: 0
PALPITATIONS: 0
COLOR CHANGE: 0
CHOKING: 0

## 2023-10-10 ASSESSMENT — PAIN SCALES - GENERAL
PAINLEVEL_OUTOF10: 0 - NO PAIN
PAINLEVEL_OUTOF10: 2
PAINLEVEL_OUTOF10: 0 - NO PAIN

## 2023-10-10 ASSESSMENT — PATIENT HEALTH QUESTIONNAIRE - PHQ9
2. FEELING DOWN, DEPRESSED OR HOPELESS: NOT AT ALL
1. LITTLE INTEREST OR PLEASURE IN DOING THINGS: NOT AT ALL
SUM OF ALL RESPONSES TO PHQ9 QUESTIONS 1 & 2: 0

## 2023-10-10 NOTE — PROGRESS NOTES
"Vancomycin Dosing by Pharmacy- INITIAL    Nayely RAMAN Gr is a 88 y.o. year old female who Pharmacy has been consulted for vancomycin dosing for cellulitis, skin and soft tissue. Based on the patient's indication and renal status this patient will be dosed based on a goal AUC of 400-600.     Renal function is currently stable.    Visit Vitals  /69 (BP Location: Right arm, Patient Position: Lying)   Pulse 61   Temp 36.5 °C (97.7 °F) (Temporal)   Resp 16        Lab Results   Component Value Date    CREATININE 0.56 10/10/2023        Patient weight is No results found for: \"PTWEIGHT\"    No results found for: \"CULTURE\"     No intake/output data recorded.  [unfilled]    No results found for: \"PATIENTTEMP\"       Assessment/Plan     Patient has already been given a loading dose of 1000 mg.  Will initiate vancomycin maintenance, a loading dose of 1000 mg followed by a dose of 1250 mg 24 hours later.    This dosing regimen is predicted by [employer] to result in the following pharmacokinetic parameters:  Loading dose: N/A  Regimen: 1250 mg IV every 24 hours.  Start time: 11:25 on 10/10/2023  Exposure target: AUC24 (range)400-600 mg/L.hr   AUC24,ss: 462 mg/L.hr  Probability of AUC24 > 400: 66 %  Ctrough,ss: 12.5 mg/L  Probability of Ctrough,ss > 20: 15 %  Probability of nephrotoxicity (Lodise JOSE G 2009): 8 %      Follow-up level will be ordered on 1250 at 24 unless clinically indicated sooner.  Will continue to monitor renal function daily while on vancomycin and order serum creatinine at least every 48 hours if not already ordered.  Follow for continued vancomycin needs, clinical response, and signs/symptoms of toxicity.       Maryjo L Lopresti, PharmD       "

## 2023-10-10 NOTE — H&P
History Of Present Illness  Nayely Gr is a 88 y.o. female presenting to [facility] on 10/9/2023 with pertinent medical history of malignant melanoma, TIA, hypercholesterolemia, hypothyroidism, and GERD who presented to the ED for a wound check.  On 9/25/2023 patient had incisional biopsy, skin removal, and lymph node removal with dermatology.  Patient had completed a course of Bactrim, however the skin around the incision became red and swollen thus switched to Keflex and doxycycline.  Patient presents with worsening redness and swelling.  Denies recent, fever/chills, headache, dizziness, chest pain / palpitations, shortness of breath, cough, abdominal pain, N/V/D, dysuria, or hematuria.    Past Medical History  She has a past medical history of GERD (gastroesophageal reflux disease), Hypercholesteremia, Hypothyroidism, Melanoma (CMS/HCC), OAB (overactive bladder), Osteoporosis, TIA (transient ischemic attack), and Vitamin D deficiency.    Surgical History  She has a past surgical history that includes Hip Arthroplasty (Right); Tonsillectomy; Skin biopsy; Lymph node biopsy; Hysterectomy; and Breast lumpectomy (Left).     Social History  She reports that she has never smoked. She has never used smokeless tobacco. She reports current alcohol use. She reports that she does not use drugs.    Family History  Family History   Problem Relation Name Age of Onset    Leukemia Mother          Allergies  Codeine      Constitutional: NEGATIVE: Fever, Chills, Malaise, Weight Loss     Eyes: NEGATIVE: Blurry Vision, Vision Loss/ Change, Redness, Drainage     ENMT: NEGATIVE: Nasal Discharge, Nasal Congestion, Throat Pain, Mouth Pain, Ear Pain     Respiratory: NEGATIVE: Dry Cough, Productive Cough, Shortness of Breath, Wheezing, Hemoptysis     Cardiac: NEGATIVE: Chest Pain, Dyspnea on Exertion, Palpitations, Orthopnea, Syncope      Gastrointestinal: Negative: Nausea, Vomiting, Diarrhea, Constipation, Abdominal Pain      Genitourinary: NEGATIVE: Dysuria, Frequency, Hematuria, Flank Pain     Musculoskeletal: Negative: Decreased ROM, Pain, Weakness, Swelling     Neurological: NEGATIVE: Confusion, Dizziness, Headache, Syncope, Seizures     Psychiatric: NEGATIVE: Anxiety     Skin: Positive LUE pain, redness, and swelling. NEGATIVE: Mass, Rash, Pruritus     Endocrine: NEGATIVE: Sweat, Thirst, Polydipsia      Hematologic/Lymph: NEGATIVE: Anemia, Bruising, Night Sweats     Allergic/Immunologic: NEGATIVE: Itching, Sneezing        Physical Exam  Constitutional: Awake and alert, Calm, and Cooperative. Speech clear. No acute distress.  Skin: Pink, warm, and dry. LUE with incision that has dehisced since with red dry crusting and granulation tissue in wound bed.  Purulent drainage noted.  Sutures with some intact and some not.  Surrounding skin with redness and swelling.  Eyes: PERRL, sclera clear, No swelling or drainage noted  ENMT: Mucous membranes pink and moist, no apparent injury, no lesions seen  Head/Neck: Neck Supple, no apparent injury, trachea midline, no palpable masses on head  Cardiac: Regular rhythm and rate, Auscultated S1 & S2, no murmurs  Lungs: CTAB, symmetrical chest rise, no accessory muscle usage, unlabored  Abdomen: Soft, non-tender, no rebound tenderness or guarding, nondistended, positive bowel sounds in all quadrants  Musculoskeletal: ROM intact, no joint swelling, normal strength  Extremities: No edema, No cyanosis, 1-2+ pulses of the extremities, see skin assessment for wounds  Neurological: Alert and Oriented x 3-forgetful, intact senses, bilateral hand grasps and foot push/pulls equal.  Psychological: Appropriate mood and behavior.       Last Recorded Vitals  Blood pressure 153/69, pulse 61, temperature 36.5 °C (97.7 °F), temperature source Temporal, resp. rate 16, height 1.524 m (5'), weight 56.7 kg (125 lb), SpO2 99 %.  Visit Vitals  /69 (BP Location: Right arm, Patient Position: Lying)   Pulse 61    Temp 36.5 °C (97.7 °F) (Temporal)   Resp 16   Ht 1.524 m (5')   Wt 56.7 kg (125 lb)   SpO2 99%   BMI 24.41 kg/m²   OB Status Postmenopausal   Smoking Status Never   BSA 1.55 m²     Weight: 56.7 kg (125 lb)   Pain Assessment: 0-10  Pain Score: 2        Relevant Results  Results for orders placed or performed during the hospital encounter of 10/09/23 (from the past 24 hour(s))   CBC   Result Value Ref Range    WBC 10.9 4.4 - 11.3 x10*3/uL    nRBC 0.0 0.0 - 0.0 /100 WBCs    RBC 4.13 4.00 - 5.20 x10*6/uL    Hemoglobin 12.3 12.0 - 16.0 g/dL    Hematocrit 38.3 36.0 - 46.0 %    MCV 93 80 - 100 fL    MCH 29.8 26.0 - 34.0 pg    MCHC 32.1 32.0 - 36.0 g/dL    RDW 12.8 11.5 - 14.5 %    Platelets 256 150 - 450 x10*3/uL    MPV 10.0 7.5 - 11.5 fL   Comprehensive metabolic panel   Result Value Ref Range    Glucose 108 (H) 74 - 99 mg/dL    Sodium 138 136 - 145 mmol/L    Potassium 3.5 3.5 - 5.3 mmol/L    Chloride 104 98 - 107 mmol/L    Bicarbonate 25 21 - 32 mmol/L    Anion Gap 13 10 - 20 mmol/L    Urea Nitrogen 17 6 - 23 mg/dL    Creatinine 0.56 0.50 - 1.05 mg/dL    eGFR 88 >60 mL/min/1.73m*2    Calcium 9.5 8.6 - 10.3 mg/dL    Albumin 4.0 3.4 - 5.0 g/dL    Alkaline Phosphatase 63 33 - 136 U/L    Total Protein 7.3 6.4 - 8.2 g/dL    AST 11 9 - 39 U/L    Bilirubin, Total 0.5 0.0 - 1.2 mg/dL    ALT 7 7 - 45 U/L        Home Medications  Prior to Admission medications    Medication Sig Start Date End Date Taking? Authorizing Provider   alendronate (Fosamax) 70 mg tablet Take 1 tablet (70 mg) by mouth every 7 days. 10/4/22   Historical Provider, MD   atorvastatin (Lipitor) 20 mg tablet Take 1 tablet (20 mg) by mouth once daily. 9/5/23   Historical Provider, MD   cephalexin (Keflex) 500 mg capsule Take 1 capsule (500 mg) by mouth 2 times a day for 14 days. 10/4/23 10/18/23  Romario Bell MD   doxycycline (Vibramycin) 100 mg capsule Take 1 capsule (100 mg) by mouth 2 times a day for 14 days. Take with at least 8 ounces (large glass) of  water, do not lie down for 30 minutes after 10/4/23 10/18/23  Romario Bell MD   esomeprazole (NexIUM) 20 mg DR capsule Take 1 capsule (20 mg) by mouth once daily. 12/21/22   Historical Provider, MD   levothyroxine (Synthroid, Levoxyl) 25 mcg tablet Take 1 tablet (25 mcg) by mouth once daily in the morning. Take before meals. Take on an empty stomach 10/5/22   Historical Provider, MD   oxybutynin (Ditropan) 5 mg tablet Take 1 tablet (5 mg) by mouth 2 times a day. 9/5/23   Historical Provider, MD       Medications  Scheduled medications  atorvastatin, 20 mg, oral, Daily  heparin (porcine), 5,000 Units, subcutaneous, q8h  levothyroxine, 25 mcg, oral, Daily before breakfast  oxybutynin, 5 mg, oral, BID  pantoprazole, 20 mg, oral, Daily before breakfast  piperacillin-tazobactam, 3.375 g, intravenous, q8h  potassium chloride, 40 mEq, oral, Once      Continuous medications     PRN medications  PRN medications: acetaminophen, melatonin, polyethylene glycol        Assessment/Plan   Principal Problem:    Cellulitis of left upper extremity  Active Problems:    Melanoma (CMS/HCC)    Dehiscence of incision    Plan:  Consult: Defer to attending.  ATB: Vancomycin pharmacy to dose, Zosyn 3.375 g IV every 8 hours.   Meds: Calcium chloride 40 mill equivalents p.o. x1, Tylenol 650 mg p.o. every 6 hours as needed for pain 1-6 melatonin 3 milligrams p.o. daily as needed for insomnia, MiraLAX 17 g p.o daily as needed for constipation.  Diet: Regular.  Vital signs with BP, HR, & RR Q 4 hours.  Pulse ox Q 4 hours.   Admission height and weight.  Daily weight.  Labs ordered: CBC, BMP, magnesium, phosphorus, blood cultures x2, wound culture.  Test ordered: Defer to attending.  Monitor / trend labs while inpatient.  Replace electrolytes as needed.  Aspiration precautions.  Out of bed with assistance   Fall precautions  Call physician for temperature < 36.5 or >38.0 degrees celsius.  Call physician for pulse <50 or >120.  Call physician  for respiratory rate <10 or >22.  Call physician for systolic blood pressure <90 or >170.  Call physician for diastolic blood pressure < 50 or >100.  Call physician for pulse ox <92%.  See orders for further plan of care ordered.     VTE prophylaxis:   Heparin subcutaneous  BLE SCDs.  Monitor for s/s of bleeding    Further evaluation and management per attending and consulting physicians.      This note has been transcribed using Dragon voice recognition system and there is a possibility of unintentional typing misprints.  Any information found to be copied from previous providers is done in the best interest of the patient to provide accurate, quality, and continuity of care.     I spent 55 minutes in the professional and overall care of this patient.      Nelda Cook, NICOLE-CNP  [facility]

## 2023-10-10 NOTE — ED PROVIDER NOTES
HPI   Chief Complaint   Patient presents with    Wound Check     Pt had surgery on left uppper arm, wound dehisced, concern for infection, redness/swelling around site,        88-year-old female presenting to [facility] ED with a complaint of continued skin infection.  She reports having a melanoma excision to her left upper arm performed 9/25 and completed a course of Bactrim but the skin around the wound began getting more red and swollen in which she was switched to Keflex and doxycycline.  States that the area continues to be red and spreading and was advised to come to the ED.  Denies fevers, palpitations, shortness of breath, lightheadedness, nausea, or vomiting.    Past medical history: Hyperlipidemia      History provided by:  Patient and relative                      Reginald Coma Scale Score: 15                  Patient History   No past medical history on file.  No past surgical history on file.  No family history on file.  Social History     Tobacco Use    Smoking status: Never    Smokeless tobacco: Never   Substance Use Topics    Alcohol use: Not on file    Drug use: Never       Physical Exam   ED Triage Vitals [10/09/23 1759]   Temp Heart Rate Resp BP   36.5 °C (97.7 °F) 71 16 (!) 166/104      SpO2 Temp Source Heart Rate Source Patient Position   98 % Temporal Monitor Sitting      BP Location FiO2 (%)     Right arm --       Physical Exam  Skin:             VITALS: I have reviewed the triage vital signs.   GENERAL: Well developed, well appearing adult in no acute distress.  Resting comfortably in bed.  NEURO: Alert and oriented. Moves all extremities. Face is symmetric and expressive.   EYES: EOMI. No scleral icterus or conjunctival injection. No discharge.   HENT: Normocephalic, atraumatic. Hearing is grossly intact. Nares grossly patent and without discharge. Mucous membranes moist with no visible lesions.   NECK: Trachea midline. Patient moves neck without restriction.   CARDIO: Rhythm regular. Normal  rate. No murmurs, rubs, or gallops. Radial/Dorsal pulses 2+ and equal bilaterally. No lower extremity edema.   PULM: Lungs clear to auscultation in all fields. No wheezes, rales, or rhonchi. No conversational dyspnea. No splinting, stridor, or accessory muscle use.    GI: Abdomen is soft and non-distended. No tenderness to palpation. No guarding or rigidity.   : No suprapubic tenderness. No CVA tenderness.  MUSCULOSKELETAL: Symmetric muscle build. No joint swelling. No clubbing, cyanosis, or deformity.   SKIN: Warm, dry, and intact. Normal turgor. No rash or lesions appreciated.  Large, dry, approximately 15 cm vertical surgical laceration to her left upper arm with stitches and an area of gaping opening approximately 4 cm that is dry.  Surrounding blanching erythema that is nontender.  PSYCH: Mood, affect, and interaction is appropriate to the setting.     ED Course & MDM   Diagnoses as of 10/10/23 0991   Cellulitis       Medical Decision Making  88-year-old female with history mentioned above presenting to ED with complaint of an intractable skin infection to her left upper arm.    Patient is afebrile, hemodynamically stable on room air, and in no acute distress.  Physical exam findings mentioned above.  CBC and CMP ordered.  IV vancomycin provided.    My independent interpretation of labs:  Labs returned unimpressive for systemic inflammation or infection, acute anemia or blood loss, DAPHNE, hepatitis, or electrolyte abnormalities.    Spoke with patient's surgeon, Romario Bell, [id number], who reports sending her in for IV vancomycin due to failed outpatient treatment. Does not recommend surgical irrigation at this time.     Disposition: Discussed with patient who agreed with admission for IV antibiotics. Patient will need admission due to failed outpatient treatment of cellulitis.      Procedure  Procedures     Olu Liu MD  Resident  10/10/23 0217

## 2023-10-10 NOTE — H&P
History Of Present Illness  Nayely Gr is a 88 y.o. female presenting with infected left arm patient had melanoma surgery with wide excision of the left arm few weeks ago patient developed open wound treated with Bactrim with no improvement patient failed outpatient treatment.     Past Medical History  She has a past medical history of GERD (gastroesophageal reflux disease), Hypercholesteremia, Hypothyroidism, Melanoma (CMS/HCC), OAB (overactive bladder), Osteoporosis, TIA (transient ischemic attack), and Vitamin D deficiency.    Surgical History  She has a past surgical history that includes Hip Arthroplasty (Right); Tonsillectomy; Skin biopsy; Lymph node biopsy; Hysterectomy; and Breast lumpectomy (Left).     Social History  She reports that she has never smoked. She has never used smokeless tobacco. She reports current alcohol use. She reports that she does not use drugs.    Family History  Family History   Problem Relation Name Age of Onset    Leukemia Mother          Allergies  Codeine    Review of Systems   Constitutional:  Negative for diaphoresis and fatigue.   HENT:  Negative for ear pain, facial swelling, tinnitus and trouble swallowing.    Eyes:  Negative for photophobia and visual disturbance.   Respiratory:  Negative for choking and stridor.    Cardiovascular:  Negative for chest pain and palpitations.   Gastrointestinal:  Negative for abdominal pain, blood in stool and diarrhea.   Endocrine: Negative for cold intolerance, heat intolerance, polydipsia and polyuria.   Musculoskeletal:  Negative for back pain and joint swelling.   Skin:  Negative for color change and rash.   Allergic/Immunologic: Negative for food allergies.   Neurological:  Negative for tremors, facial asymmetry and weakness.   Psychiatric/Behavioral:  The patient is not hyperactive.         Physical Exam  HENT:      Right Ear: External ear normal.      Left Ear: External ear normal.      Mouth/Throat:      Mouth: Mucous  membranes are moist.   Cardiovascular:      Rate and Rhythm: Normal rate and regular rhythm.      Heart sounds: No murmur heard.     No friction rub. No gallop.   Pulmonary:      Effort: No accessory muscle usage or respiratory distress.      Breath sounds: No stridor. No wheezing or rhonchi.   Chest:      Chest wall: No tenderness.   Abdominal:      General: There is no distension.      Palpations: There is no mass.      Tenderness: There is no abdominal tenderness. There is no guarding or rebound.   Musculoskeletal:         General: No deformity or signs of injury.      Cervical back: No rigidity or tenderness. Normal range of motion.      Right lower leg: No edema.      Left lower leg: No edema.   Skin:     Coloration: Skin is not jaundiced or pale.      Findings: No lesion.      Comments: Large open wound over the left arm with fibrin slough and necrotic tissue and redness surrounding the wound consistent with cellulitis   Neurological:      General: No focal deficit present.      Mental Status: She is alert, oriented to person, place, and time and easily aroused.      Cranial Nerves: No cranial nerve deficit.      Sensory: No sensory deficit.      Motor: No weakness.          Last Recorded Vitals  Blood pressure 110/72, pulse 61, temperature 36.5 °C (97.7 °F), temperature source Temporal, resp. rate 16, height 1.524 m (5'), weight 56.7 kg (125 lb), SpO2 95 %.    Labs    Admission on 10/09/2023   Component Date Value Ref Range Status    WBC 10/10/2023 10.9  4.4 - 11.3 x10*3/uL Final    nRBC 10/10/2023 0.0  0.0 - 0.0 /100 WBCs Final    RBC 10/10/2023 4.13  4.00 - 5.20 x10*6/uL Final    Hemoglobin 10/10/2023 12.3  12.0 - 16.0 g/dL Final    Hematocrit 10/10/2023 38.3  36.0 - 46.0 % Final    MCV 10/10/2023 93  80 - 100 fL Final    MCH 10/10/2023 29.8  26.0 - 34.0 pg Final    MCHC 10/10/2023 32.1  32.0 - 36.0 g/dL Final    RDW 10/10/2023 12.8  11.5 - 14.5 % Final    Platelets 10/10/2023 256  150 - 450 x10*3/uL Final     MPV 10/10/2023 10.0  7.5 - 11.5 fL Final    Glucose 10/10/2023 108 (H)  74 - 99 mg/dL Final    Sodium 10/10/2023 138  136 - 145 mmol/L Final    Potassium 10/10/2023 3.5  3.5 - 5.3 mmol/L Final    Chloride 10/10/2023 104  98 - 107 mmol/L Final    Bicarbonate 10/10/2023 25  21 - 32 mmol/L Final    Anion Gap 10/10/2023 13  10 - 20 mmol/L Final    Urea Nitrogen 10/10/2023 17  6 - 23 mg/dL Final    Creatinine 10/10/2023 0.56  0.50 - 1.05 mg/dL Final    eGFR 10/10/2023 88  >60 mL/min/1.73m*2 Final    Calculations of estimated GFR are performed using the 2021 CKD-EPI Study Refit equation without the race variable for the IDMS-Traceable creatinine methods.  https://jasn.asnjournals.org/content/early/2021/09/22/ASN.1949149562    Calcium 10/10/2023 9.5  8.6 - 10.3 mg/dL Final    Albumin 10/10/2023 4.0  3.4 - 5.0 g/dL Final    Alkaline Phosphatase 10/10/2023 63  33 - 136 U/L Final    Total Protein 10/10/2023 7.3  6.4 - 8.2 g/dL Final    AST 10/10/2023 11  9 - 39 U/L Final    Bilirubin, Total 10/10/2023 0.5  0.0 - 1.2 mg/dL Final    ALT 10/10/2023 7  7 - 45 U/L Final    Patients treated with Sulfasalazine may generate falsely decreased results for ALT.         Imaging     Electrocardiogram 12 Lead  Sinus bradycardia with 1st degree AV block  Otherwise normal ECG  No previous ECGs available  Confirmed by Beatriz Reyes ([id number]) on 10/2/2023 8:40:36 AM       Patient Active Problem List   Diagnosis    Melanoma (CMS/HCC)    Dehiscence of incision    Cellulitis of left upper extremity    Hypokalemia    Cellulitis         Assessment/Plan   Principal Problem:    Cellulitis of left upper extremity  Active Problems:    Melanoma (CMS/HCC)    Dehiscence of incision    Hypokalemia    Cellulitis      Start patient on IV antibiotic wound culture consult plastic for debridement of the wound remove stitches of the part of the wound that is open       I spent 45 minutes in the professional and overall care of this patient.      RAMAN Zabala MD

## 2023-10-11 ENCOUNTER — APPOINTMENT (OUTPATIENT)
Dept: DERMATOLOGY | Facility: CLINIC | Age: 88
End: 2023-10-11
Payer: COMMERCIAL

## 2023-10-11 ENCOUNTER — TELEPHONE (OUTPATIENT)
Dept: SURGICAL ONCOLOGY | Facility: HOSPITAL | Age: 88
End: 2023-10-11

## 2023-10-11 LAB
ANION GAP SERPL CALC-SCNC: 13 MMOL/L (ref 10–20)
BUN SERPL-MCNC: 12 MG/DL (ref 6–23)
CALCIUM SERPL-MCNC: 8.9 MG/DL (ref 8.6–10.3)
CHLORIDE SERPL-SCNC: 107 MMOL/L (ref 98–107)
CO2 SERPL-SCNC: 22 MMOL/L (ref 21–32)
CREAT SERPL-MCNC: 0.68 MG/DL (ref 0.5–1.05)
ERYTHROCYTE [DISTWIDTH] IN BLOOD BY AUTOMATED COUNT: 13 % (ref 11.5–14.5)
GFR SERPL CREATININE-BSD FRML MDRD: 84 ML/MIN/1.73M*2
GLUCOSE SERPL-MCNC: 99 MG/DL (ref 74–99)
HCT VFR BLD AUTO: 35.9 % (ref 36–46)
HGB BLD-MCNC: 11.4 G/DL (ref 12–16)
MAGNESIUM SERPL-MCNC: 2.21 MG/DL (ref 1.6–2.4)
MCH RBC QN AUTO: 30.2 PG (ref 26–34)
MCHC RBC AUTO-ENTMCNC: 31.8 G/DL (ref 32–36)
MCV RBC AUTO: 95 FL (ref 80–100)
NRBC BLD-RTO: 0 /100 WBCS (ref 0–0)
PLATELET # BLD AUTO: 240 X10*3/UL (ref 150–450)
PMV BLD AUTO: 10.3 FL (ref 7.5–11.5)
POTASSIUM SERPL-SCNC: 3.7 MMOL/L (ref 3.5–5.3)
RBC # BLD AUTO: 3.78 X10*6/UL (ref 4–5.2)
SODIUM SERPL-SCNC: 138 MMOL/L (ref 136–145)
WBC # BLD AUTO: 8.6 X10*3/UL (ref 4.4–11.3)

## 2023-10-11 PROCEDURE — 80048 BASIC METABOLIC PNL TOTAL CA: CPT | Performed by: NURSE PRACTITIONER

## 2023-10-11 PROCEDURE — 1100000001 HC PRIVATE ROOM DAILY

## 2023-10-11 PROCEDURE — 2500000001 HC RX 250 WO HCPCS SELF ADMINISTERED DRUGS (ALT 637 FOR MEDICARE OP): Performed by: PHYSICIAN ASSISTANT

## 2023-10-11 PROCEDURE — 2500000001 HC RX 250 WO HCPCS SELF ADMINISTERED DRUGS (ALT 637 FOR MEDICARE OP): Performed by: NURSE PRACTITIONER

## 2023-10-11 PROCEDURE — 99221 1ST HOSP IP/OBS SF/LOW 40: CPT | Performed by: PHYSICIAN ASSISTANT

## 2023-10-11 PROCEDURE — 2500000004 HC RX 250 GENERAL PHARMACY W/ HCPCS (ALT 636 FOR OP/ED): Performed by: STUDENT IN AN ORGANIZED HEALTH CARE EDUCATION/TRAINING PROGRAM

## 2023-10-11 PROCEDURE — 83735 ASSAY OF MAGNESIUM: CPT | Performed by: NURSE PRACTITIONER

## 2023-10-11 PROCEDURE — 2500000004 HC RX 250 GENERAL PHARMACY W/ HCPCS (ALT 636 FOR OP/ED): Performed by: NURSE PRACTITIONER

## 2023-10-11 PROCEDURE — 36415 COLL VENOUS BLD VENIPUNCTURE: CPT | Performed by: NURSE PRACTITIONER

## 2023-10-11 PROCEDURE — 85027 COMPLETE CBC AUTOMATED: CPT | Performed by: NURSE PRACTITIONER

## 2023-10-11 RX ORDER — BACITRACIN 500 [USP'U]/G
OINTMENT TOPICAL 3 TIMES DAILY
Status: DISCONTINUED | OUTPATIENT
Start: 2023-10-11 | End: 2023-10-12 | Stop reason: HOSPADM

## 2023-10-11 RX ADMIN — PIPERACILLIN SODIUM AND TAZOBACTAM SODIUM 4.5 G: 4; .5 INJECTION, SOLUTION INTRAVENOUS at 21:38

## 2023-10-11 RX ADMIN — PIPERACILLIN SODIUM AND TAZOBACTAM SODIUM 3.38 G: 3; .375 INJECTION, SOLUTION INTRAVENOUS at 11:41

## 2023-10-11 RX ADMIN — PIPERACILLIN SODIUM AND TAZOBACTAM SODIUM 3.38 G: 3; .375 INJECTION, SOLUTION INTRAVENOUS at 01:47

## 2023-10-11 RX ADMIN — OXYBUTYNIN CHLORIDE 5 MG: 5 TABLET ORAL at 08:41

## 2023-10-11 RX ADMIN — BACITRACIN: 500 OINTMENT TOPICAL at 21:38

## 2023-10-11 RX ADMIN — OXYBUTYNIN CHLORIDE 5 MG: 5 TABLET ORAL at 21:36

## 2023-10-11 RX ADMIN — BACITRACIN: 500 OINTMENT TOPICAL at 14:19

## 2023-10-11 RX ADMIN — ATORVASTATIN CALCIUM 20 MG: 20 TABLET, FILM COATED ORAL at 08:41

## 2023-10-11 RX ADMIN — LEVOTHYROXINE SODIUM 25 MCG: 25 TABLET ORAL at 06:32

## 2023-10-11 RX ADMIN — PANTOPRAZOLE SODIUM 20 MG: 20 TABLET, DELAYED RELEASE ORAL at 06:33

## 2023-10-11 SDOH — SOCIAL STABILITY: SOCIAL NETWORK: HOW OFTEN DO YOU ATTENT MEETINGS OF THE CLUB OR ORGANIZATION YOU BELONG TO?: MORE THAN 4 TIMES PER YEAR

## 2023-10-11 SDOH — ECONOMIC STABILITY: FOOD INSECURITY: WITHIN THE PAST 12 MONTHS, THE FOOD YOU BOUGHT JUST DIDN'T LAST AND YOU DIDN'T HAVE MONEY TO GET MORE.: NEVER TRUE

## 2023-10-11 SDOH — ECONOMIC STABILITY: HOUSING INSECURITY
IN THE LAST 12 MONTHS, WAS THERE A TIME WHEN YOU DID NOT HAVE A STEADY PLACE TO SLEEP OR SLEPT IN A SHELTER (INCLUDING NOW)?: NO

## 2023-10-11 SDOH — HEALTH STABILITY: MENTAL HEALTH
STRESS IS WHEN SOMEONE FEELS TENSE, NERVOUS, ANXIOUS, OR CAN'T SLEEP AT NIGHT BECAUSE THEIR MIND IS TROUBLED. HOW STRESSED ARE YOU?: ONLY A LITTLE

## 2023-10-11 SDOH — ECONOMIC STABILITY: TRANSPORTATION INSECURITY
IN THE PAST 12 MONTHS, HAS LACK OF TRANSPORTATION KEPT YOU FROM MEETINGS, WORK, OR FROM GETTING THINGS NEEDED FOR DAILY LIVING?: NO

## 2023-10-11 SDOH — HEALTH STABILITY: MENTAL HEALTH: HOW OFTEN DO YOU HAVE 6 OR MORE DRINKS ON ONE OCCASION?: NEVER

## 2023-10-11 SDOH — HEALTH STABILITY: PHYSICAL HEALTH: ON AVERAGE, HOW MANY DAYS PER WEEK DO YOU ENGAGE IN MODERATE TO STRENUOUS EXERCISE (LIKE A BRISK WALK)?: 3 DAYS

## 2023-10-11 SDOH — HEALTH STABILITY: MENTAL HEALTH: HOW MANY STANDARD DRINKS CONTAINING ALCOHOL DO YOU HAVE ON A TYPICAL DAY?: PATIENT DOES NOT DRINK

## 2023-10-11 SDOH — SOCIAL STABILITY: SOCIAL NETWORK: ARE YOU MARRIED, WIDOWED, DIVORCED, SEPARATED, NEVER MARRIED, OR LIVING WITH A PARTNER?: WIDOWED

## 2023-10-11 SDOH — ECONOMIC STABILITY: FOOD INSECURITY: WITHIN THE PAST 12 MONTHS, YOU WORRIED THAT YOUR FOOD WOULD RUN OUT BEFORE YOU GOT MONEY TO BUY MORE.: NEVER TRUE

## 2023-10-11 SDOH — SOCIAL STABILITY: SOCIAL INSECURITY: WITHIN THE LAST YEAR, HAVE YOU BEEN HUMILIATED OR EMOTIONALLY ABUSED IN OTHER WAYS BY YOUR PARTNER OR EX-PARTNER?: NO

## 2023-10-11 SDOH — SOCIAL STABILITY: SOCIAL NETWORK
DO YOU BELONG TO ANY CLUBS OR ORGANIZATIONS SUCH AS CHURCH GROUPS UNIONS, FRATERNAL OR ATHLETIC GROUPS, OR SCHOOL GROUPS?: YES

## 2023-10-11 SDOH — SOCIAL STABILITY: SOCIAL INSECURITY
WITHIN THE LAST YEAR, HAVE TO BEEN RAPED OR FORCED TO HAVE ANY KIND OF SEXUAL ACTIVITY BY YOUR PARTNER OR EX-PARTNER?: NO

## 2023-10-11 SDOH — SOCIAL STABILITY: SOCIAL INSECURITY
WITHIN THE LAST YEAR, HAVE YOU BEEN KICKED, HIT, SLAPPED, OR OTHERWISE PHYSICALLY HURT BY YOUR PARTNER OR EX-PARTNER?: NO

## 2023-10-11 SDOH — ECONOMIC STABILITY: INCOME INSECURITY: IN THE LAST 12 MONTHS, WAS THERE A TIME WHEN YOU WERE NOT ABLE TO PAY THE MORTGAGE OR RENT ON TIME?: NO

## 2023-10-11 SDOH — HEALTH STABILITY: MENTAL HEALTH: HOW OFTEN DO YOU HAVE A DRINK CONTAINING ALCOHOL?: NEVER

## 2023-10-11 SDOH — HEALTH STABILITY: PHYSICAL HEALTH: ON AVERAGE, HOW MANY MINUTES DO YOU ENGAGE IN EXERCISE AT THIS LEVEL?: 30 MIN

## 2023-10-11 SDOH — ECONOMIC STABILITY: INCOME INSECURITY: HOW HARD IS IT FOR YOU TO PAY FOR THE VERY BASICS LIKE FOOD, HOUSING, MEDICAL CARE, AND HEATING?: NOT HARD AT ALL

## 2023-10-11 SDOH — ECONOMIC STABILITY: INCOME INSECURITY: IN THE PAST 12 MONTHS, HAS THE ELECTRIC, GAS, OIL, OR WATER COMPANY THREATENED TO SHUT OFF SERVICE IN YOUR HOME?: NO

## 2023-10-11 SDOH — SOCIAL STABILITY: SOCIAL NETWORK: HOW OFTEN DO YOU ATTEND CHURCH OR RELIGIOUS SERVICES?: MORE THAN 4 TIMES PER YEAR

## 2023-10-11 SDOH — SOCIAL STABILITY: SOCIAL INSECURITY: WITHIN THE LAST YEAR, HAVE YOU BEEN AFRAID OF YOUR PARTNER OR EX-PARTNER?: NO

## 2023-10-11 SDOH — SOCIAL STABILITY: SOCIAL NETWORK
IN A TYPICAL WEEK, HOW MANY TIMES DO YOU TALK ON THE PHONE WITH FAMILY, FRIENDS, OR NEIGHBORS?: MORE THAN THREE TIMES A WEEK

## 2023-10-11 SDOH — SOCIAL STABILITY: SOCIAL NETWORK: HOW OFTEN DO YOU GET TOGETHER WITH FRIENDS OR RELATIVES?: MORE THAN THREE TIMES A WEEK

## 2023-10-11 SDOH — ECONOMIC STABILITY: TRANSPORTATION INSECURITY
IN THE PAST 12 MONTHS, HAS THE LACK OF TRANSPORTATION KEPT YOU FROM MEDICAL APPOINTMENTS OR FROM GETTING MEDICATIONS?: NO

## 2023-10-11 ASSESSMENT — COGNITIVE AND FUNCTIONAL STATUS - GENERAL
MOBILITY SCORE: 22
WALKING IN HOSPITAL ROOM: A LITTLE
MOBILITY SCORE: 22
DAILY ACTIVITIY SCORE: 24
DAILY ACTIVITIY SCORE: 24
CLIMB 3 TO 5 STEPS WITH RAILING: A LITTLE
CLIMB 3 TO 5 STEPS WITH RAILING: A LITTLE
WALKING IN HOSPITAL ROOM: A LITTLE

## 2023-10-11 ASSESSMENT — LIFESTYLE VARIABLES
SKIP TO QUESTIONS 9-10: 1
AUDIT-C TOTAL SCORE: 0

## 2023-10-11 ASSESSMENT — PAIN SCALES - GENERAL
PAINLEVEL_OUTOF10: 0 - NO PAIN
PAINLEVEL_OUTOF10: 0 - NO PAIN

## 2023-10-11 ASSESSMENT — PAIN - FUNCTIONAL ASSESSMENT
PAIN_FUNCTIONAL_ASSESSMENT: 0-10
PAIN_FUNCTIONAL_ASSESSMENT: 0-10

## 2023-10-11 ASSESSMENT — ACTIVITIES OF DAILY LIVING (ADL): LACK_OF_TRANSPORTATION: NO

## 2023-10-11 NOTE — CONSULTS
Inpatient consult to Infectious Diseases  Consult performed by: Deep Noonan DO  Consult ordered by: Olya Grayson PA-C  Reason for consult: wound antibiotic recommendations      History Of Present Illness  Nayely Gr is a 88 y.o. female presenting with persistent unhealed wound after melanoma excision.  ID was consulted for antibiotic recommendations.  Patient recently underwent melanoma excision with Dr. Bell on 9/25 of her left arm, and due to poor wound healing, was initiated on p.o. Bactrim for several days.  However her wound persisted, and was prescribed Keflex and doxycycline on 10/4, again without any improvement.  She was advised by her plastic surgeon to come to the ED for further evaluation and IV antibiotics.  She was admitted on 10/9/2023, and started on IV Zosyn and vancomycin.  She has been evaluated by plastic surgery here, and they do not recommend any surgical debridement at this time. Patient denies any significant pain of the wound, and has only noticed clear serosanguineous drainage.  Denies any recent fevers or chills.  Denies history of aortic aneurysms.     Past Medical History  Past Medical History:   Diagnosis Date    GERD (gastroesophageal reflux disease)     Hypercholesteremia     Hypothyroidism     Melanoma (CMS/HCC)     OAB (overactive bladder)     Osteoporosis     TIA (transient ischemic attack)     Vitamin D deficiency        Surgical History  Past Surgical History:   Procedure Laterality Date    BREAST LUMPECTOMY Left     HIP ARTHROPLASTY Right     HYSTERECTOMY      LYMPH NODE BIOPSY      With removal    SKIN BIOPSY      TONSILLECTOMY           Social History   reports that she has never smoked. She has never used smokeless tobacco. She reports current alcohol use. She reports that she does not use drugs.    Family History  Family History   Problem Relation Name Age of Onset    Leukemia Mother          Allergies  Codeine    Review of Systems  Review of Systems (bold =  positive):     Constitutional: Fever, Chills  Eyes: Blurry Vision, Vision Loss/ Change  ENMT: Nasal Discharge, Nasal Congestion  Respiratory: Dry Cough, Productive Cough, Wheezing, Shortness of Breath  Cardiac: Chest Pain, Syncope, Palpitations  Gastrointestinal: Nausea, Vomiting, Diarrhea, Constipation, Abdominal Pain  Genitourinary: Discharge, Dysuria, Flank Pain  Musculoskeletal: Pain, Swelling, Weakness  Neurological:  Dizziness, Confusion, Headache, Syncope  Skin: Pain, Rash, Ulcer  Endocrine: Sweat, Polyuria  Hematologic/Lymph:  Night Sweats, Petechiae  Allergic/Immunologic: Anaphylaxis    Physical Exam  Constitutional: Well developed, awake/alert/oriented x4, no distress, alert and cooperative  Skin: Surgical excision extending from proximal forearm to elbow, with area of wound dehiscence measuring about 5 cm x 4 cm with some minimal serosanguineous drainage.  Minimal tenderness to palpation around the area, with minimal warmth.  Sutures intact  Eyes: Anicteric, clear sclera  ENMT: mucous membranes moist, no apparent injury, no lesions seen  Head/Neck: Atraumatic  Respiratory: Lungs clear to auscultation bilaterally with no wheezes, rhonci or crackles  CV: Regular rate and rhythm, no murmurs or gallops auscultated  GI: Non-tender to deep palpation, no guarding  MSK: no joint swelling  Extremities: normal extremities, no cyanosis edema, contusions or wounds, no clubbing  Psych: Appropriate mood and behavior    Last Recorded Vitals  /54 (BP Location: Right arm, Patient Position: Lying)   Pulse 59   Temp 36.7 °C (98.1 °F) (Temporal)   Resp 17   Wt 56.7 kg (125 lb)   SpO2 97%     Results  Results for orders placed or performed during the hospital encounter of 10/09/23 (from the past 24 hour(s))   CBC   Result Value Ref Range    WBC 8.1 4.4 - 11.3 x10*3/uL    nRBC 0.0 0.0 - 0.0 /100 WBCs    RBC 4.07 4.00 - 5.20 x10*6/uL    Hemoglobin 12.2 12.0 - 16.0 g/dL    Hematocrit 38.7 36.0 - 46.0 %    MCV 95 80 -  100 fL    MCH 30.0 26.0 - 34.0 pg    MCHC 31.5 (L) 32.0 - 36.0 g/dL    RDW 12.9 11.5 - 14.5 %    Platelets 243 150 - 450 x10*3/uL    MPV 10.5 7.5 - 11.5 fL   Blood Culture    Specimen: Peripheral Arterial Puncture; Blood culture   Result Value Ref Range    Blood Culture Loaded on Instrument - Culture in progress    Blood Culture    Specimen: Peripheral Arterial Puncture; Blood culture   Result Value Ref Range    Blood Culture Loaded on Instrument - Culture in progress    Magnesium   Result Value Ref Range    Magnesium 2.24 1.60 - 2.40 mg/dL   Basic metabolic panel   Result Value Ref Range    Glucose 140 (H) 74 - 99 mg/dL    Sodium 139 136 - 145 mmol/L    Potassium 4.1 3.5 - 5.3 mmol/L    Chloride 106 98 - 107 mmol/L    Bicarbonate 23 21 - 32 mmol/L    Anion Gap 14 10 - 20 mmol/L    Urea Nitrogen 12 6 - 23 mg/dL    Creatinine 0.82 0.50 - 1.05 mg/dL    eGFR 69 >60 mL/min/1.73m*2    Calcium 9.1 8.6 - 10.3 mg/dL   Phosphorus   Result Value Ref Range    Phosphorus 3.4 2.5 - 4.9 mg/dL   Magnesium   Result Value Ref Range    Magnesium 2.21 1.60 - 2.40 mg/dL   CBC   Result Value Ref Range    WBC 8.6 4.4 - 11.3 x10*3/uL    nRBC 0.0 0.0 - 0.0 /100 WBCs    RBC 3.78 (L) 4.00 - 5.20 x10*6/uL    Hemoglobin 11.4 (L) 12.0 - 16.0 g/dL    Hematocrit 35.9 (L) 36.0 - 46.0 %    MCV 95 80 - 100 fL    MCH 30.2 26.0 - 34.0 pg    MCHC 31.8 (L) 32.0 - 36.0 g/dL    RDW 13.0 11.5 - 14.5 %    Platelets 240 150 - 450 x10*3/uL    MPV 10.3 7.5 - 11.5 fL   Basic metabolic panel   Result Value Ref Range    Glucose 99 74 - 99 mg/dL    Sodium 138 136 - 145 mmol/L    Potassium 3.7 3.5 - 5.3 mmol/L    Chloride 107 98 - 107 mmol/L    Bicarbonate 22 21 - 32 mmol/L    Anion Gap 13 10 - 20 mmol/L    Urea Nitrogen 12 6 - 23 mg/dL    Creatinine 0.68 0.50 - 1.05 mg/dL    eGFR 84 >60 mL/min/1.73m*2    Calcium 8.9 8.6 - 10.3 mg/dL       Imaging  Electrocardiogram 12 Lead  Sinus bradycardia with 1st degree AV block  Otherwise normal ECG  No previous ECGs  available  Confirmed by Beatriz Reyes ([id number]) on 10/2/2023 8:40:36 AM       Assessment/Plan     88-year-old female presenting for failure wound healing after melanoma excision on 9/25 of the right upper extremity.  Wound cultures currently growing Pseudomonas, speciation pending.    Assessment:  #Right forearm wound, Pseudomonas  #Melanoma s/p excision, with dehiscent wound as above  #Hypothyroidism    Plan:  -Current wound culture growing Pseudomonas, susceptibilities pending  -Discontinue IV vancomycin, continue with IV Zosyn 4.5 mg every 8 hours.  Will convert to p.o. fluoroquinolone if susceptible. Otherwise, will require PICC line placement. Anticipate 2 weeks of treatment.    To be staffed with attending,  Deep Noonan, DO  Internal Medicine PGY-3

## 2023-10-11 NOTE — TELEPHONE ENCOUNTER
Danette Vizcarraroyer Gr's daughter called with questions regarding her mother; can be reached at [phone]

## 2023-10-11 NOTE — CONSULTS
Plastic and Reconstructive Surgery            Initial Inpatient Consult Note    Reason For Consult  Left upper extremity wound dehiscence     History Of Present Illness  Nayely Gr is a 88 y.o. female presenting with cellulitis of the left upper extremity.     She is s/p excisional biopsy for melanoma on 9/25/23. The patient states she was taking bactrim after surgery. She states that the area around he arm began to turn red. She was switched to different antibiotics by her Dermatology team. She states that the redness worsened and spread down her arm which prompted her to present to the ED. She was admitted to IV antibiotic treatment of upper extremity cellulitis. Plastic surgery consulted for dehisced wound.      Past Medical History  She has a past medical history of GERD (gastroesophageal reflux disease), Hypercholesteremia, Hypothyroidism, Melanoma (CMS/HCC), OAB (overactive bladder), Osteoporosis, TIA (transient ischemic attack), and Vitamin D deficiency.    Surgical History  She has a past surgical history that includes Hip Arthroplasty (Right); Tonsillectomy; Skin biopsy; Lymph node biopsy; Hysterectomy; and Breast lumpectomy (Left).     Social History  She reports that she has never smoked. She has never used smokeless tobacco. She reports current alcohol use. She reports that she does not use drugs.    Family History  Family History   Problem Relation Name Age of Onset    Leukemia Mother          Allergies  Codeine    Review of Systems  All other systems have been reviewed with the patient and have been found to be negative with exception to the chief complaint as mentioned in the history of present illness.    ROS: As noted in history of present illness    - CONSTITUTIONAL: Denies weight loss, fever and chills.  - HEENT: Denies changes in vision and hearing.  - RESPIRATORY: Denies SOB and cough, difficulty breathing  - CV: Denies palpitations and CP  - GI: Denies abdominal pain, nausea,  vomiting and diarrhea.  - : Denies dysuria and urinary frequency.  - MSK: Denies myalgia and joint pain.  - SKIN: positive for redness of the left upper arm, positive for wound of the left upper arm  - NEUROLOGICAL: Denies headache and syncope.    Physical Exam  Gen: interactive and pleasant  Head: NCAT  Eyes: EOMI, PERRLA  Mouth: MMM  Throat: trachea midline  Cor: RRR  Pulm: nonlabored breathing  Abd: s/nt/nd  Neuro: AAOx3    Focused exam of the: left upper extremity    There is surgical incision that spans from the proximal aspect of the LUE to above the elbow. There is dehiscence of wound I the center. There is scab present in the wound bed there is no evidence of purulence, necrosis. There is no odor to the wound. There is surrounding erythema and tenderness to palpation.  Wound bed with red granulation tissue present        Last Recorded Vitals  /54 (BP Location: Right arm, Patient Position: Lying)   Pulse 59   Temp 36.7 °C (98.1 °F) (Temporal)   Resp 17   Wt 56.7 kg (125 lb)   SpO2 97%     Relevant Results       Assessment/Plan     Nayely Gr is a 88 y.o. female presenting with cellulitis of the left upper extremity.     On exam there is dehiscence of surgical wound. There is scab present in the wound bed. There is red granulation tissue present. There is no purulent drainage from the wound. There is surrounding erythema and warmth to the touch. There is mild TTP around the wound over the redness. Proximal and distal sutures are intact.     Plan:   -discussed with attending surgeon Dr. Parkinson, recommending local wound care with bacitracin and dry gauze daily.   -primary medical team for antibiotic management  -bedside nurse informed me they have contacted Dr Bell who recommended bacitracin and dry gauze as well.   -will defer outpatient wound care follow up to Dr. Bell  -no surgical intervention required at this time.   -plastic surgery to s/o, please recall as needed.       Signature:  Vandana Kemp PA-C  Time: 9:49 AM

## 2023-10-11 NOTE — PROGRESS NOTES
Patient lives at [facility]. She does provide her own transportation. Uses a walker on occasions for long distances.  She denies any needs at discharge and plans to return back to her apartment.

## 2023-10-11 NOTE — CARE PLAN
The patient's goals for the shift include sleep    The clinical goals for the shift include safety      Problem: Pain - Adult  Goal: Verbalizes/displays adequate comfort level or baseline comfort level  Outcome: Progressing

## 2023-10-11 NOTE — PROGRESS NOTES
Nayely Gr is a 88 y.o. female on day 1 of admission presenting with Cellulitis of left upper extremity.      Subjective   Patient seen today resting comfortably admitted for antibiotic treatment covering for the patient's attending       Objective   ROS:  General: Denies any change in weight, fever, chills, fatigue.  Head and Neck: Denies any headaches, syncope or history of head injury.  Eyes/Ears: Denies any cataracts, glaucoma, blurred vision, tinnitus.  Nose: Denies any epistaxis or sinus problems  Respiratory: Denies any cough, hemoptysis, wheezing, asthma, dyspnea.  Cardiovascular: No orthopnea, dyspnea, palpitations, congestive heart failure.  Gastrointestinal: Denies any indigestion, nausea, vomiting, diarrhea, constipation.      OBJECTIVE  Patient Vitals for the past 24 hrs:   BP Temp Temp src Pulse Resp SpO2   10/11/23 0800 114/54 36.7 °C (98.1 °F) Temporal 59 17 --   10/11/23 0000 129/62 36.5 °C (97.7 °F) Temporal 60 18 97 %   10/10/23 2000 100/61 36.9 °C (98.4 °F) Temporal 63 18 96 %     /54 (BP Location: Right arm, Patient Position: Lying)   Pulse 59   Temp 36.7 °C (98.1 °F) (Temporal)   Resp 17   Wt 56.7 kg (125 lb)   SpO2 97%     PHYSICAL EXAM:   Constitutional: Alert, oriented  Eyes: PERRLA, EOMI, sclera nonicteric  ENMT: Mucous membranes dry  Head/Neck: No carotid bruits  Respiratory/Thorax: CTA  Cardiovascular: RRR with few ectopic beats  Gastrointestinal: Soft, NT, nondistended, neg dey's  Musculoskeletal: Full ROM  Extremities: No Edema, Distal pulses 2+=  Neurological: CN's grossly intact  Psychological: A&Ox3, appropriate, conversant  Skin: Cellulitis noted          Intake/Output last 3 Shifts:    Intake/Output Summary (Last 24 hours) at 10/11/2023 0930  Last data filed at 10/10/2023 2330  Gross per 24 hour   Intake 830 ml   Output --   Net 830 ml        Image Results    Results for orders placed or performed during the hospital encounter of 10/09/23 (from the past 24  hour(s))   CBC   Result Value Ref Range    WBC 8.1 4.4 - 11.3 x10*3/uL    nRBC 0.0 0.0 - 0.0 /100 WBCs    RBC 4.07 4.00 - 5.20 x10*6/uL    Hemoglobin 12.2 12.0 - 16.0 g/dL    Hematocrit 38.7 36.0 - 46.0 %    MCV 95 80 - 100 fL    MCH 30.0 26.0 - 34.0 pg    MCHC 31.5 (L) 32.0 - 36.0 g/dL    RDW 12.9 11.5 - 14.5 %    Platelets 243 150 - 450 x10*3/uL    MPV 10.5 7.5 - 11.5 fL   Blood Culture    Specimen: Peripheral Arterial Puncture; Blood culture   Result Value Ref Range    Blood Culture Loaded on Instrument - Culture in progress    Blood Culture    Specimen: Peripheral Arterial Puncture; Blood culture   Result Value Ref Range    Blood Culture Loaded on Instrument - Culture in progress    Magnesium   Result Value Ref Range    Magnesium 2.24 1.60 - 2.40 mg/dL   Basic metabolic panel   Result Value Ref Range    Glucose 140 (H) 74 - 99 mg/dL    Sodium 139 136 - 145 mmol/L    Potassium 4.1 3.5 - 5.3 mmol/L    Chloride 106 98 - 107 mmol/L    Bicarbonate 23 21 - 32 mmol/L    Anion Gap 14 10 - 20 mmol/L    Urea Nitrogen 12 6 - 23 mg/dL    Creatinine 0.82 0.50 - 1.05 mg/dL    eGFR 69 >60 mL/min/1.73m*2    Calcium 9.1 8.6 - 10.3 mg/dL   Phosphorus   Result Value Ref Range    Phosphorus 3.4 2.5 - 4.9 mg/dL   CBC   Result Value Ref Range    WBC 8.6 4.4 - 11.3 x10*3/uL    nRBC 0.0 0.0 - 0.0 /100 WBCs    RBC 3.78 (L) 4.00 - 5.20 x10*6/uL    Hemoglobin 11.4 (L) 12.0 - 16.0 g/dL    Hematocrit 35.9 (L) 36.0 - 46.0 %    MCV 95 80 - 100 fL    MCH 30.2 26.0 - 34.0 pg    MCHC 31.8 (L) 32.0 - 36.0 g/dL    RDW 13.0 11.5 - 14.5 %    Platelets 240 150 - 450 x10*3/uL    MPV 10.3 7.5 - 11.5 fL      No results found.   Scheduled medications  Scheduled medications  atorvastatin, 20 mg, oral, Daily  heparin (porcine), 5,000 Units, subcutaneous, q8h  levothyroxine, 25 mcg, oral, Daily before breakfast  oxybutynin, 5 mg, oral, BID  pantoprazole, 20 mg, oral, Daily before breakfast  piperacillin-tazobactam, 3.375 g, intravenous, q8h  vancomycin,  1,250 mg, intravenous, q24h      Continuous medications     PRN medications  PRN medications: acetaminophen, melatonin, polyethylene glycol       Relevant Results               Problem List Items Addressed This Visit             ICD-10-CM    RESOLVED: Cellulitis - Primary (Chronic) L03.90       Assessment/Plan           Principal Problem:    Cellulitis of left upper extremity  Active Problems:    Melanoma (CMS/HCC)    Dehiscence of incision    Hypokalemia    Cellulitis    Continue antibiotics  Recheck cultures  Recheck white count  DVT prophylaxis              Rojelio Monte MD

## 2023-10-11 NOTE — PROGRESS NOTES
Vancomycin Dosing by Pharmacy- Cessation of Therapy    Consult to pharmacy for vancomycin dosing has been discontinued by the prescriber, pharmacy will sign off at this time.    Please call pharmacy if there are further questions or re-enter a consult if vancomycin is resumed.     Jae Ferraro, PharmD

## 2023-10-11 NOTE — CARE PLAN
Problem: Fall/Injury  Goal: Not fall by end of shift  Outcome: Progressing  Note: Utilize call light appropriately, ambulate with staff asst  Goal: Be free from injury by end of the shift  Outcome: Progressing     Problem: Skin  Goal: Decreased wound size/increased tissue granulation at next dressing change  Outcome: Progressing  Note: Dressing changes per orders  Goal: Participates in plan/prevention/treatment measures  Outcome: Progressing

## 2023-10-12 VITALS
RESPIRATION RATE: 17 BRPM | SYSTOLIC BLOOD PRESSURE: 127 MMHG | TEMPERATURE: 96.3 F | WEIGHT: 125 LBS | HEART RATE: 59 BPM | OXYGEN SATURATION: 95 % | BODY MASS INDEX: 24.54 KG/M2 | HEIGHT: 60 IN | DIASTOLIC BLOOD PRESSURE: 60 MMHG

## 2023-10-12 PROBLEM — K21.9 GERD WITHOUT ESOPHAGITIS: Status: ACTIVE | Noted: 2023-06-14

## 2023-10-12 PROBLEM — M81.0 AGE-RELATED OSTEOPOROSIS WITHOUT CURRENT PATHOLOGICAL FRACTURE: Status: ACTIVE | Noted: 2023-06-14

## 2023-10-12 PROBLEM — Z99.89 USES ROLLER WALKER: Status: ACTIVE | Noted: 2023-06-14

## 2023-10-12 PROBLEM — E03.9 HYPOTHYROIDISM: Status: ACTIVE | Noted: 2023-06-14

## 2023-10-12 PROBLEM — R26.2 DIFFICULTY IN WALKING: Status: RESOLVED | Noted: 2023-06-14 | Resolved: 2023-10-12

## 2023-10-12 PROBLEM — E87.6 HYPOKALEMIA: Status: RESOLVED | Noted: 2023-10-10 | Resolved: 2023-10-12

## 2023-10-12 PROBLEM — E55.9 VITAMIN D DEFICIENCY: Status: ACTIVE | Noted: 2023-06-14

## 2023-10-12 PROBLEM — C43.9 MELANOMA (MULTI): Status: RESOLVED | Noted: 2023-10-10 | Resolved: 2023-10-12

## 2023-10-12 PROBLEM — E78.00 HYPERCHOLESTEROLEMIA: Status: ACTIVE | Noted: 2023-06-14

## 2023-10-12 PROBLEM — N32.81 OVERACTIVE BLADDER: Status: ACTIVE | Noted: 2023-06-14

## 2023-10-12 LAB
ANION GAP SERPL CALC-SCNC: 13 MMOL/L (ref 10–20)
BACTERIA SPEC CULT: ABNORMAL
BUN SERPL-MCNC: 10 MG/DL (ref 6–23)
CALCIUM SERPL-MCNC: 8.8 MG/DL (ref 8.6–10.3)
CHLORIDE SERPL-SCNC: 108 MMOL/L (ref 98–107)
CO2 SERPL-SCNC: 21 MMOL/L (ref 21–32)
CREAT SERPL-MCNC: 0.71 MG/DL (ref 0.5–1.05)
ERYTHROCYTE [DISTWIDTH] IN BLOOD BY AUTOMATED COUNT: 13.1 % (ref 11.5–14.5)
GFR SERPL CREATININE-BSD FRML MDRD: 82 ML/MIN/1.73M*2
GLUCOSE SERPL-MCNC: 92 MG/DL (ref 74–99)
GRAM STN SPEC: ABNORMAL
GRAM STN SPEC: ABNORMAL
HCT VFR BLD AUTO: 37.3 % (ref 36–46)
HGB BLD-MCNC: 12 G/DL (ref 12–16)
MCH RBC QN AUTO: 31.7 PG (ref 26–34)
MCHC RBC AUTO-ENTMCNC: 32.2 G/DL (ref 32–36)
MCV RBC AUTO: 98 FL (ref 80–100)
NRBC BLD-RTO: 0 /100 WBCS (ref 0–0)
PLATELET # BLD AUTO: 214 X10*3/UL (ref 150–450)
PMV BLD AUTO: 10 FL (ref 7.5–11.5)
POTASSIUM SERPL-SCNC: 3.6 MMOL/L (ref 3.5–5.3)
RBC # BLD AUTO: 3.79 X10*6/UL (ref 4–5.2)
SODIUM SERPL-SCNC: 138 MMOL/L (ref 136–145)
WBC # BLD AUTO: 7.3 X10*3/UL (ref 4.4–11.3)

## 2023-10-12 PROCEDURE — 2500000001 HC RX 250 WO HCPCS SELF ADMINISTERED DRUGS (ALT 637 FOR MEDICARE OP): Performed by: STUDENT IN AN ORGANIZED HEALTH CARE EDUCATION/TRAINING PROGRAM

## 2023-10-12 PROCEDURE — 82374 ASSAY BLOOD CARBON DIOXIDE: CPT | Performed by: NURSE PRACTITIONER

## 2023-10-12 PROCEDURE — 2500000001 HC RX 250 WO HCPCS SELF ADMINISTERED DRUGS (ALT 637 FOR MEDICARE OP): Performed by: NURSE PRACTITIONER

## 2023-10-12 PROCEDURE — 36415 COLL VENOUS BLD VENIPUNCTURE: CPT | Performed by: NURSE PRACTITIONER

## 2023-10-12 PROCEDURE — 2500000004 HC RX 250 GENERAL PHARMACY W/ HCPCS (ALT 636 FOR OP/ED): Performed by: STUDENT IN AN ORGANIZED HEALTH CARE EDUCATION/TRAINING PROGRAM

## 2023-10-12 PROCEDURE — 80048 BASIC METABOLIC PNL TOTAL CA: CPT | Performed by: NURSE PRACTITIONER

## 2023-10-12 PROCEDURE — 2500000004 HC RX 250 GENERAL PHARMACY W/ HCPCS (ALT 636 FOR OP/ED): Mod: MUE | Performed by: NURSE PRACTITIONER

## 2023-10-12 PROCEDURE — 85027 COMPLETE CBC AUTOMATED: CPT | Performed by: NURSE PRACTITIONER

## 2023-10-12 RX ORDER — BACITRACIN 500 [USP'U]/G
OINTMENT TOPICAL 3 TIMES DAILY
Qty: 1 PACKET | Refills: 0 | Status: SHIPPED | OUTPATIENT
Start: 2023-10-12

## 2023-10-12 RX ORDER — LEVOFLOXACIN 750 MG/1
750 TABLET ORAL DAILY
Qty: 14 TABLET | Refills: 0 | Status: SHIPPED | OUTPATIENT
Start: 2023-10-12 | End: 2023-10-26

## 2023-10-12 RX ORDER — LEVOFLOXACIN 750 MG/1
750 TABLET ORAL
Status: DISCONTINUED | OUTPATIENT
Start: 2023-10-12 | End: 2023-10-12 | Stop reason: HOSPADM

## 2023-10-12 RX ADMIN — BACITRACIN: 500 OINTMENT TOPICAL at 09:58

## 2023-10-12 RX ADMIN — LEVOFLOXACIN 750 MG: 750 TABLET, FILM COATED ORAL at 11:30

## 2023-10-12 RX ADMIN — PIPERACILLIN SODIUM AND TAZOBACTAM SODIUM 4.5 G: 4; .5 INJECTION, SOLUTION INTRAVENOUS at 05:52

## 2023-10-12 RX ADMIN — LEVOTHYROXINE SODIUM 25 MCG: 25 TABLET ORAL at 06:02

## 2023-10-12 RX ADMIN — OXYBUTYNIN CHLORIDE 5 MG: 5 TABLET ORAL at 09:58

## 2023-10-12 RX ADMIN — PANTOPRAZOLE SODIUM 20 MG: 20 TABLET, DELAYED RELEASE ORAL at 06:02

## 2023-10-12 RX ADMIN — ACETAMINOPHEN 650 MG: 325 TABLET ORAL at 05:55

## 2023-10-12 ASSESSMENT — COGNITIVE AND FUNCTIONAL STATUS - GENERAL
CLIMB 3 TO 5 STEPS WITH RAILING: A LITTLE
MOBILITY SCORE: 23
DAILY ACTIVITIY SCORE: 24

## 2023-10-12 ASSESSMENT — PAIN SCALES - WONG BAKER: WONGBAKER_NUMERICALRESPONSE: HURTS LITTLE MORE

## 2023-10-12 ASSESSMENT — PAIN DESCRIPTION - DESCRIPTORS: DESCRIPTORS: SHOOTING

## 2023-10-12 ASSESSMENT — PAIN - FUNCTIONAL ASSESSMENT
PAIN_FUNCTIONAL_ASSESSMENT: 0-10
PAIN_FUNCTIONAL_ASSESSMENT: 0-10
PAIN_FUNCTIONAL_ASSESSMENT: WONG-BAKER FACES

## 2023-10-12 ASSESSMENT — PAIN SCALES - GENERAL
PAINLEVEL_OUTOF10: 8
PAINLEVEL_OUTOF10: 0 - NO PAIN

## 2023-10-12 NOTE — CARE PLAN
The patient's goals for the shift include     Problem: Pain - Adult  Goal: Verbalizes/displays adequate comfort level or baseline comfort level  Outcome: Progressing     Problem: Infection related to problem list condition  Goal: Infection will resolve through treatment  Outcome: Progressing     Problem: Fall/Injury  Goal: Not fall by end of shift  Outcome: Progressing  Goal: Be free from injury by end of the shift  Outcome: Progressing  Goal: Verbalize understanding of personal risk factors for fall in the hospital  Outcome: Progressing  Goal: Verbalize understanding of risk factor reduction measures to prevent injury from fall in the home  Outcome: Progressing  Goal: Use assistive devices by end of the shift  Outcome: Progressing  Goal: Pace activities to prevent fatigue by end of the shift  Outcome: Progressing     Problem: Skin  Goal: Decreased wound size/increased tissue granulation at next dressing change  Outcome: Progressing  Goal: Participates in plan/prevention/treatment measures  Outcome: Progressing  Goal: Prevent/manage excess moisture  Outcome: Progressing  Goal: Prevent/minimize sheer/friction injuries  Outcome: Progressing  Goal: Promote/optimize nutrition  Outcome: Progressing  Goal: Promote skin healing  Outcome: Progressing     The clinical goals for the shift include monitoring labs, vital signs, wound dressing, and pain.    Over the shift, the patient did make progress toward the following goals. She is receiving Zosyn 4.5g IV every 8 hours, dressing change done last night, and patient given PRN tylenol for pain.

## 2023-10-12 NOTE — PROGRESS NOTES
Nayely Gr is a 88 y.o. female on day 2 of admission presenting with Cellulitis of left upper extremity.      Subjective   Patient examined at bedside this morning, eager for discharge.  Wound appears clean, no significant drainage at this time.       Objective     Last Recorded Vitals  /60 (BP Location: Right arm, Patient Position: Lying)   Pulse 59   Temp 35.7 °C (96.3 °F) (Temporal)   Resp 17   Wt 56.7 kg (125 lb)   SpO2 95%   Intake/Output last 3 Shifts:    Intake/Output Summary (Last 24 hours) at 10/12/2023 1121  Last data filed at 10/12/2023 0552  Gross per 24 hour   Intake 820 ml   Output 1 ml   Net 819 ml       Admission Weight  Weight: 56.7 kg (125 lb) (10/09/23 1759)    Daily Weight  10/09/23 : 56.7 kg (125 lb)      Scheduled medications  atorvastatin, 20 mg, oral, Daily  bacitracin, , Topical, TID  heparin (porcine), 5,000 Units, subcutaneous, q8h  levoFLOXacin, 750 mg, oral, q24h DANTE  levothyroxine, 25 mcg, oral, Daily before breakfast  oxybutynin, 5 mg, oral, BID  pantoprazole, 20 mg, oral, Daily before breakfast      Continuous medications     PRN medications  PRN medications: acetaminophen, melatonin, polyethylene glycol     Physical Exam  Constitutional: Well developed, awake/alert/oriented x4, no distress, alert and cooperative  Skin: Surgical excision extending from proximal forearm to elbow, with area of wound dehiscence measuring about 5 cm x 4 cm with some minimal serosanguineous drainage.  Minimal tenderness to palpation around the area, with minimal warmth.  Sutures intact  Eyes: Anicteric, clear sclera  ENMT: mucous membranes moist, no apparent injury, no lesions seen  Head/Neck: Atraumatic  Respiratory: Lungs clear to auscultation bilaterally with no wheezes, rhonci or crackles  CV: Regular rate and rhythm, no murmurs or gallops auscultated  GI: Non-tender to deep palpation, no guarding  MSK: no joint swelling  Extremities: normal extremities, no cyanosis edema, contusions  or wounds, no clubbing  Psych: Appropriate mood and behavior    Relevant Results  Results for orders placed or performed during the hospital encounter of 10/09/23 (from the past 24 hour(s))   CBC   Result Value Ref Range    WBC 7.3 4.4 - 11.3 x10*3/uL    nRBC 0.0 0.0 - 0.0 /100 WBCs    RBC 3.79 (L) 4.00 - 5.20 x10*6/uL    Hemoglobin 12.0 12.0 - 16.0 g/dL    Hematocrit 37.3 36.0 - 46.0 %    MCV 98 80 - 100 fL    MCH 31.7 26.0 - 34.0 pg    MCHC 32.2 32.0 - 36.0 g/dL    RDW 13.1 11.5 - 14.5 %    Platelets 214 150 - 450 x10*3/uL    MPV 10.0 7.5 - 11.5 fL   Basic metabolic panel   Result Value Ref Range    Glucose 92 74 - 99 mg/dL    Sodium 138 136 - 145 mmol/L    Potassium 3.6 3.5 - 5.3 mmol/L    Chloride 108 (H) 98 - 107 mmol/L    Bicarbonate 21 21 - 32 mmol/L    Anion Gap 13 10 - 20 mmol/L    Urea Nitrogen 10 6 - 23 mg/dL    Creatinine 0.71 0.50 - 1.05 mg/dL    eGFR 82 >60 mL/min/1.73m*2    Calcium 8.8 8.6 - 10.3 mg/dL        Image Results  Electrocardiogram 12 Lead  Sinus bradycardia with 1st degree AV block  Otherwise normal ECG  No previous ECGs available  Confirmed by Beatriz Reyes ([id number]) on 10/2/2023 8:40:36 AM           Assessment/Plan   88-year-old female presenting for failure wound healing after melanoma excision on 9/25 of the left upper extremity.  Wound cultures currently growing Pseudomonas pan-sensitive     Assessment:  #Left forearm wound, Pseudomonas pan-sensitive  #Melanoma s/p excision, with dehiscent wound as above  #Hypothyroidism     Plan:  -Current wound culture growing Pseudomonas, pan-sensitive  -Discontinue IV Zosyn.  Initiate p.o. Levaquin 750 mg daily, to complete total antibiotic course of 14 days (stop date 10/24)  -Cleared for discharge from ID perspective     To be staffed with attending,  Deep Noonan DO  Internal Medicine PGY-3

## 2023-10-12 NOTE — NURSING NOTE
Discharge instructions given to pt and daughter, all questions answered. IV removed. Pt discharged to home.

## 2023-10-12 NOTE — PROGRESS NOTES
Nayely Gr is a 88 y.o. female on day 2 of admission presenting with Cellulitis of left upper extremity.      Subjective   Continues to have pain over the left arm and open sore       Objective     Last Recorded Vitals  /60 (BP Location: Right arm, Patient Position: Lying)   Pulse 59   Temp 35.7 °C (96.3 °F) (Temporal)   Resp 17   Wt 56.7 kg (125 lb)   SpO2 95%   Intake/Output last 3 Shifts:    Intake/Output Summary (Last 24 hours) at 10/12/2023 0833  Last data filed at 10/12/2023 0552  Gross per 24 hour   Intake 1060 ml   Output 2 ml   Net 1058 ml       Admission Weight  Weight: 56.7 kg (125 lb) (10/09/23 1759)    Daily Weight  10/09/23 : 56.7 kg (125 lb)    Image Results  Electrocardiogram 12 Lead  Sinus bradycardia with 1st degree AV block  Otherwise normal ECG  No previous ECGs available  Confirmed by Beatriz Reyes ([id number]) on 10/2/2023 8:40:36 AM      Physical Exam  HENT:      Right Ear: External ear normal.      Left Ear: External ear normal.      Mouth/Throat:      Mouth: Mucous membranes are moist.   Cardiovascular:      Rate and Rhythm: Normal rate and regular rhythm.      Heart sounds: No murmur heard.     No friction rub. No gallop.   Pulmonary:      Effort: No accessory muscle usage or respiratory distress.      Breath sounds: No stridor. No wheezing or rhonchi.   Chest:      Chest wall: No tenderness.   Abdominal:      General: There is no distension.      Palpations: There is no mass.      Tenderness: There is no abdominal tenderness. There is no guarding or rebound.   Musculoskeletal:         General: No deformity or signs of injury.      Cervical back: No rigidity or tenderness. Normal range of motion.      Right lower leg: No edema.      Left lower leg: No edema.   Skin:     Coloration: Skin is not jaundiced or pale.      Findings: No lesion.      Comments: Open ulcer over the left arm   Neurological:      General: No focal deficit present.      Mental Status: She is alert, oriented to  person, place, and time and easily aroused.      Cranial Nerves: No cranial nerve deficit.      Sensory: No sensory deficit.      Motor: No weakness.         Relevant Results               Assessment/Plan                  Principal Problem:    Cellulitis of left upper extremity  Active Problems:    Melanoma (CMS/HCC)    Dehiscence of incision    Hypokalemia    Cellulitis    Continue with IV antibiotic culture grew Pseudomonas waiting for final sensitivity infectious disease note was reviewed follow-up with her plastic surgeon as an outpatient for the open wound              RAMAN Zabala MD

## 2023-10-14 LAB
BACTERIA BLD CULT: NORMAL

## 2023-10-17 ENCOUNTER — OFFICE VISIT (OUTPATIENT)
Dept: SURGICAL ONCOLOGY | Facility: CLINIC | Age: 88
End: 2023-10-17
Payer: COMMERCIAL

## 2023-10-17 VITALS
SYSTOLIC BLOOD PRESSURE: 164 MMHG | DIASTOLIC BLOOD PRESSURE: 75 MMHG | WEIGHT: 123.02 LBS | OXYGEN SATURATION: 98 % | RESPIRATION RATE: 16 BRPM | HEART RATE: 66 BPM | BODY MASS INDEX: 24.03 KG/M2 | TEMPERATURE: 97.3 F

## 2023-10-17 DIAGNOSIS — C43.62 MELANOMA OF LEFT UPPER ARM (MULTI): Primary | ICD-10-CM

## 2023-10-17 PROCEDURE — 1036F TOBACCO NON-USER: CPT | Performed by: STUDENT IN AN ORGANIZED HEALTH CARE EDUCATION/TRAINING PROGRAM

## 2023-10-17 PROCEDURE — 1159F MED LIST DOCD IN RCRD: CPT | Performed by: STUDENT IN AN ORGANIZED HEALTH CARE EDUCATION/TRAINING PROGRAM

## 2023-10-17 PROCEDURE — 1126F AMNT PAIN NOTED NONE PRSNT: CPT | Performed by: STUDENT IN AN ORGANIZED HEALTH CARE EDUCATION/TRAINING PROGRAM

## 2023-10-17 PROCEDURE — 99024 POSTOP FOLLOW-UP VISIT: CPT | Performed by: STUDENT IN AN ORGANIZED HEALTH CARE EDUCATION/TRAINING PROGRAM

## 2023-10-17 PROCEDURE — 1111F DSCHRG MED/CURRENT MED MERGE: CPT | Performed by: STUDENT IN AN ORGANIZED HEALTH CARE EDUCATION/TRAINING PROGRAM

## 2023-10-17 ASSESSMENT — PATIENT HEALTH QUESTIONNAIRE - PHQ9
2. FEELING DOWN, DEPRESSED OR HOPELESS: NOT AT ALL
SUM OF ALL RESPONSES TO PHQ9 QUESTIONS 1 AND 2: 0
1. LITTLE INTEREST OR PLEASURE IN DOING THINGS: NOT AT ALL

## 2023-10-17 ASSESSMENT — ENCOUNTER SYMPTOMS
DEPRESSION: 0
OCCASIONAL FEELINGS OF UNSTEADINESS: 0
LOSS OF SENSATION IN FEET: 0

## 2023-10-17 ASSESSMENT — PAIN SCALES - GENERAL: PAINLEVEL: 0-NO PAIN

## 2023-10-17 NOTE — LETTER
October 17, 2023     Magnolia Soto, APRN-CNP  [address]  [facility]  [address]  [facility] [address]    Patient: Nayely Gr   YOB: 1935   Date of Visit: 10/17/2023       Dear Dr. Magnolia Soto, APRN-CNP:    Thank you for referring Nayely Gr to me for evaluation. Below are my notes for this consultation.  If you have questions, please do not hesitate to call me. I look forward to following your patient along with you.       Sincerely,     Romario Bell MD      CC: No Recipients  ______________________________________________________________________________________    ASSESSMENT AND PLAN  Nayely Gr is a 88 y.o. female who is now s/p wide local excision and sentinel lymph node biopsy on 9/25/23.  Her initial biopsy was 1.3 mm thick but she clearly had a T4 tumor on physical exam.  Therefore we got a preoperative PET scan that was negative for systemic disease.  There was some mildly hypermetabolic activity in the left axilla but this was ultimately felt to not be concerning at our tumor board discussion.  On final pathology she had a 9.5cm tumor with in transit mets and 2/3 positive SLN.  We discussed that she needs to complete imaging with a brain MRI and to discuss adjuvant therapy options with medical oncology.  She lives in [place] and would like to see Dr. Gena Abreu in [place].  We will arrange the brain MRI and left axillary lymph node ultrasound surveillance at [facility].    With regard to her wound infection it appears that this is getting better.  She will continue her course of Levaquin.  If she has any more erythema or wound issues she will call us and we can figure out what to do but if she continues to heal well she will follow-up remotely and her daughter will continue to send us pictures.  Removed her sutures in clinic today and dressed her wound with antibiotic ointment and gauze.  She will continue this regimen going forward.    Romario STEWART  MD Arabella  Assistant Professor of Surgery  Division of Surgical Oncology  [phone]  [email or web address]        SUBJECTIVE  Nayely Gr is a 88 y.o. female who presents for a postoperative clinic visit.  Referring provider: Magnolia Soto  Diagnosis: primary cutaneous melanoma  Clinical thickness: clinically t4  Surgery: Wide local excision and sentinel lymph node biopsy on 9/25/23  Postoperative issues: Wound infection that progressed through 2 rounds of outpatient antibiotics necessitating admission to [facility] for IV antibiotics.  Wound cultures grew abundant Pseudomonas.  She was treated initially with Zosyn and transition to Levaquin 750 mg daily for 14 days, stop date 10/24.  Her wound is much improved at this point.    Physical exam  Large left upper arm incision with 3 cm dehiscence in the middle with scabbing and healthy wound bed.  Mild blanching erythema surrounding this aspect of the wound.  Prolenes mostly still intact.  Left axillary incision is clean dry and intact.     Surgical Pathology  FINAL DIAGNOSIS  A.  NODE, LEFT AXILLARY SENTINEL LYMPH NODE #1, BIOPSY:  FOCAL SOX-10 POSITIVE STAINING, SEE NOTE.    Note: Microscopic examination reveals a lymph node. In the subcapsular space  there are two couplets of SOX-10 positive cells are not seen on the Melan-A or  HMB45 stain. All control slides stain appropriately. The primary melanoma in  [id number] was reviewed and the cytology is similar. A metastatic melanoma in 1/1  lymph node without extracapsular extension and greatest deposit size of 0.02 mm  is favored.      B.  NODE, LEFT AXILLARY SENTINEL LYMPH NODE #2, BIOPSY:  FOCAL SOX-10 POSITIVE STAINING, SEE NOTE.    Note: Microscopic examination reveals an occasional SOX-10 positive cell in the  subcapsular space compared to the primary melanoma in [id number]. They are not  seen on the Melan-A or HMB45 stain. All control slides stain appropriately. The  findings are suspicious for  metastatic malignant melanoma in 1/1 lymph node  with a greatest deposit size of 0.01 mm.      C.  NODE, LEFT AXILLARY SENTINEL LYMPH NODE #3, BIOPSY:  BENIGN LYMPH NODE.    D.  SKIN, WIDE LOCAL EXCISION LEFT UPPER ARM 6x16CM:  ULCERATED MALIGNANT MELANOMA, BRESLOW THICKNESS 9.5 MM WITH IN TRANSIT  METASTASIS, INKED MARGINS FREE IN PLANES OF SECTIONS EXAMINED, SEE NOTE.    Note: Microscopic examination reveals a specimen that extends into the  subcutaneous fat. There is an ulcer and there are atypical epithelioid and  spindled cells in the dermis. In slide D15 away from the main tumor body and in  slide D22 in the subcutaneous fat there are atypical spindled cells. The tumor  is present in slides D12 through 19 predominantly. In slide D22 the atypical  spindle cells stain with antibodies against SOX-10. All control slides stain  appropriately.                  **Electronically Signed Out by ASHLEIGH OSBORN M.D.**    Note  One or more of the reagents used to perform assays on this specimen MAY have  contained components considered to be analyte specific reagents (ASR's).  ASR's  have not been cleared or approved by the U.S. Food and Drug Administration.  These assays were developed and their performance characteristics determined by  the Department of Pathology at [facility].  The FDA does not require this test to go through premarket FDA review. This  test is used for clinical purposes. It should not be regarded as  investigational or for research. This laboratory is certified under the  Clinical Laboratory Improvement Amendments (CLIA) as qualified to perform high  complexity clinical laboratory testing.  The assays were performed with  appropriate positive and negative controls which stained appropriately.                                                                                                                                               CASE SUMMARY REPORT  D.  SKIN, WIDE  LOCAL EXCISION LEFT UPPER ARM 6x16CM:       SPECIMEN  Procedure:      Re-excision       [place] node(s) biopsy  Specimen Laterality:      Left  TUMOR  Tumor Site:      Skin of upper limb and shoulder: Left upper arm   Histologic Type:         Primary dermal vs Nodular   Maximum Tumor (Breslow) Thickness (Millimeters):         9.5 mm   Macroscopic Satellite Nodule(s):        Not identified   Ulceration:        Present          Extent of Ulceration (Millimeters): 15 mm   Anatomic (Aquiles) Level:        V (Melanoma invades subcutis)   Mitotic Rate:         15 mitoses per mm2   Microsatellite(s):        Present   Lymphovascular Invasion:        Not identified   Neurotropism:        Not identified   Tumor-Infiltrating Lymphocytes:        Present, nonbrisk   Tumor Regression:        Not identified  MARGINS   Margin Status for Invasive Melanoma:        All margins negative for invasive  melanoma          Closest Margin Location(s) to Invasive Melanoma: Medial     Distance from Invasive Melanoma to Peripheral Margin:            8 mm     Distance from Invasive Melanoma to Deep Margin:           Cannot be determined: The fat had to be shaved off of the epidermis  and dermis due to the thickness of the specimen.  REGIONAL LYMPH NODES   Regional Lymph Node Status:          Tumor present in regional lymph node(s)       Total Number of Lymph Nodes with Tumor:             2       Number of [place] Lymph Nodes with Tumor:             2       [place] Site(s) with Tumor:            Intramedullary       Size of Largest [place] Node Metastatic Deposit:              0.02 mm       Size of Largest [place] Metastatic Deposit:             0.02 mm       Extranodal Extension:            Present       Matted Nodes:            Not identified     Total Number of Lymph Nodes Examined:           3     Number of [place] Nodes Examined:           3  DISTANT METASTASIS  PATHOLOGIC STAGE CLASSIFICATION (pTNM, AJCC 8th Edition)  Reporting of pT, pN, and  (when applicable) pM categories is based on  information available to the pathologist at the time the report is issued. As  per the AJCC (Chapter 1, 8th Ed.) it is the managing physician’s responsibility  to establish the final pathologic stage based upon all pertinent information,  including but potentially not limited to this pathology report.   pT Category:        pT4b   pN Category:        pN2c  ADDITIONAL FINDINGS  Additional Findings:       None  ADDITIONAL TESTING  Representative Blocks:      Normal Block: Not applicable       Tumor Block: D12 through 19

## 2023-10-17 NOTE — PROGRESS NOTES
ASSESSMENT AND PLAN  Nayely Gr is a 88 y.o. female who is now s/p wide local excision and sentinel lymph node biopsy on 9/25/23.  Her initial biopsy was 1.3 mm thick but she clearly had a T4 tumor on physical exam.  Therefore we got a preoperative PET scan that was negative for systemic disease.  There was some mildly hypermetabolic activity in the left axilla but this was ultimately felt to not be concerning at our tumor board discussion.  On final pathology she had a 9.5cm tumor with in transit mets and 2/3 positive SLN.  We discussed that she needs to complete imaging with a brain MRI and to discuss adjuvant therapy options with medical oncology.  She lives in [place] and would like to see Dr. Gena Abreu in [place].  We will arrange the brain MRI and left axillary lymph node ultrasound surveillance at [facility].    With regard to her wound infection it appears that this is getting better.  She will continue her course of Levaquin.  If she has any more erythema or wound issues she will call us and we can figure out what to do but if she continues to heal well she will follow-up remotely and her daughter will continue to send us pictures.  Removed her sutures in clinic today and dressed her wound with antibiotic ointment and gauze.  She will continue this regimen going forward.    Romario Bell MD  Assistant Professor of Surgery  Division of Surgical Oncology  [phone]  [email or web address]        SUBJECTIVE  Nayely Gr is a 88 y.o. female who presents for a postoperative clinic visit.  Referring provider: Magnolia Soto  Diagnosis: primary cutaneous melanoma  Clinical thickness: clinically t4  Surgery: Wide local excision and sentinel lymph node biopsy on 9/25/23  Postoperative issues: Wound infection that progressed through 2 rounds of outpatient antibiotics necessitating admission to [facility] for IV antibiotics.  Wound cultures grew abundant Pseudomonas.  She was treated  initially with Zosyn and transition to Levaquin 750 mg daily for 14 days, stop date 10/24.  Her wound is much improved at this point.    Physical exam  Large left upper arm incision with 3 cm dehiscence in the middle with scabbing and healthy wound bed.  Mild blanching erythema surrounding this aspect of the wound.  Prolenes mostly still intact.  Left axillary incision is clean dry and intact.     Surgical Pathology  FINAL DIAGNOSIS  A.  NODE, LEFT AXILLARY SENTINEL LYMPH NODE #1, BIOPSY:  FOCAL SOX-10 POSITIVE STAINING, SEE NOTE.    Note: Microscopic examination reveals a lymph node. In the subcapsular space  there are two couplets of SOX-10 positive cells are not seen on the Melan-A or  HMB45 stain. All control slides stain appropriately. The primary melanoma in  [id number] was reviewed and the cytology is similar. A metastatic melanoma in 1/1  lymph node without extracapsular extension and greatest deposit size of 0.02 mm  is favored.      B.  NODE, LEFT AXILLARY SENTINEL LYMPH NODE #2, BIOPSY:  FOCAL SOX-10 POSITIVE STAINING, SEE NOTE.    Note: Microscopic examination reveals an occasional SOX-10 positive cell in the  subcapsular space compared to the primary melanoma in [id number]. They are not  seen on the Melan-A or HMB45 stain. All control slides stain appropriately. The  findings are suspicious for metastatic malignant melanoma in 1/1 lymph node  with a greatest deposit size of 0.01 mm.      C.  NODE, LEFT AXILLARY SENTINEL LYMPH NODE #3, BIOPSY:  BENIGN LYMPH NODE.    D.  SKIN, WIDE LOCAL EXCISION LEFT UPPER ARM 6x16CM:  ULCERATED MALIGNANT MELANOMA, BRESLOW THICKNESS 9.5 MM WITH IN TRANSIT  METASTASIS, INKED MARGINS FREE IN PLANES OF SECTIONS EXAMINED, SEE NOTE.    Note: Microscopic examination reveals a specimen that extends into the  subcutaneous fat. There is an ulcer and there are atypical epithelioid and  spindled cells in the dermis. In slide D15 away from the main tumor body and in  slide D22 in the  subcutaneous fat there are atypical spindled cells. The tumor  is present in slides D12 through 19 predominantly. In slide D22 the atypical  spindle cells stain with antibodies against SOX-10. All control slides stain  appropriately.                  **Electronically Signed Out by ASHLEIGH OSBORN M.D.**    Note  One or more of the reagents used to perform assays on this specimen MAY have  contained components considered to be analyte specific reagents (ASR's).  ASR's  have not been cleared or approved by the U.S. Food and Drug Administration.  These assays were developed and their performance characteristics determined by  the Department of Pathology at [facility].  The FDA does not require this test to go through premarket FDA review. This  test is used for clinical purposes. It should not be regarded as  investigational or for research. This laboratory is certified under the  Clinical Laboratory Improvement Amendments (CLIA) as qualified to perform high  complexity clinical laboratory testing.  The assays were performed with  appropriate positive and negative controls which stained appropriately.                                                                                                                                               CASE SUMMARY REPORT  D.  SKIN, WIDE LOCAL EXCISION LEFT UPPER ARM 6x16CM:       SPECIMEN  Procedure:      Re-excision       [place] node(s) biopsy  Specimen Laterality:      Left  TUMOR  Tumor Site:      Skin of upper limb and shoulder: Left upper arm   Histologic Type:         Primary dermal vs Nodular   Maximum Tumor (Breslow) Thickness (Millimeters):         9.5 mm   Macroscopic Satellite Nodule(s):        Not identified   Ulceration:        Present          Extent of Ulceration (Millimeters): 15 mm   Anatomic (Aquiles) Level:        V (Melanoma invades subcutis)   Mitotic Rate:         15 mitoses per mm2   Microsatellite(s):        Present    Lymphovascular Invasion:        Not identified   Neurotropism:        Not identified   Tumor-Infiltrating Lymphocytes:        Present, nonbrisk   Tumor Regression:        Not identified  MARGINS   Margin Status for Invasive Melanoma:        All margins negative for invasive  melanoma          Closest Margin Location(s) to Invasive Melanoma: Medial     Distance from Invasive Melanoma to Peripheral Margin:            8 mm     Distance from Invasive Melanoma to Deep Margin:           Cannot be determined: The fat had to be shaved off of the epidermis  and dermis due to the thickness of the specimen.  REGIONAL LYMPH NODES   Regional Lymph Node Status:          Tumor present in regional lymph node(s)       Total Number of Lymph Nodes with Tumor:             2       Number of [place] Lymph Nodes with Tumor:             2       [place] Site(s) with Tumor:            Intramedullary       Size of Largest [place] Node Metastatic Deposit:              0.02 mm       Size of Largest [place] Metastatic Deposit:             0.02 mm       Extranodal Extension:            Present       Matted Nodes:            Not identified     Total Number of Lymph Nodes Examined:           3     Number of [place] Nodes Examined:           3  DISTANT METASTASIS  PATHOLOGIC STAGE CLASSIFICATION (pTNM, AJCC 8th Edition)  Reporting of pT, pN, and (when applicable) pM categories is based on  information available to the pathologist at the time the report is issued. As  per the AJCC (Chapter 1, 8th Ed.) it is the managing physician’s responsibility  to establish the final pathologic stage based upon all pertinent information,  including but potentially not limited to this pathology report.   pT Category:        pT4b   pN Category:        pN2c  ADDITIONAL FINDINGS  Additional Findings:       None  ADDITIONAL TESTING  Representative Blocks:      Normal Block: Not applicable       Tumor Block: D12 through 19

## 2023-10-26 ENCOUNTER — PATIENT OUTREACH (OUTPATIENT)
Dept: HEMATOLOGY/ONCOLOGY | Facility: HOSPITAL | Age: 88
End: 2023-10-26
Payer: COMMERCIAL

## 2023-10-26 NOTE — PROGRESS NOTES
10/26/2023 Nurse oncology navigator reached out to the patient and was able to speak with the patient's contact, daughter, and she has confirmed a new patient visit with Dr. Abreu on 11/8/2023, medical oncology. Eileen Bateman RN.

## 2023-11-10 ENCOUNTER — APPOINTMENT (OUTPATIENT)
Dept: RADIOLOGY | Facility: CLINIC | Age: 88
End: 2023-11-10
Payer: COMMERCIAL

## 2023-11-14 NOTE — DISCHARGE SUMMARY
Discharge Diagnosis  Cellulitis of left upper extremity follow-up with plastic surgery  Issues Requiring Follow-Up      Discharge Meds     Your medication list        START taking these medications        Instructions Last Dose Given Next Dose Due   bacitracin 500 unit/gram ointment      Apply topically 3 times a day.       levoFLOXacin 750 mg tablet  Commonly known as: Levaquin      Take 1 tablet (750 mg) by mouth once daily for 14 days.              CONTINUE taking these medications        Instructions Last Dose Given Next Dose Due   alendronate 70 mg tablet  Commonly known as: Fosamax           atorvastatin 20 mg tablet  Commonly known as: Lipitor           esomeprazole 20 mg DR capsule  Commonly known as: NexIUM           levothyroxine 25 mcg tablet  Commonly known as: Synthroid, Levoxyl           oxybutynin 5 mg tablet  Commonly known as: Ditropan                  STOP taking these medications      cephalexin 500 mg capsule  Commonly known as: Keflex        doxycycline 100 mg capsule  Commonly known as: Vibramycin                  Where to Get Your Medications        These medications were sent to [id number] - [place], [place] - [address]  [address], [facility] [address]      Phone: [phone]   bacitracin 500 unit/gram ointment  levoFLOXacin 750 mg tablet         Test Results Pending At Discharge  Pending Labs       No current pending labs.            Hospital Course   Patient was admitted to the hospital with cellulitis of the left upper extremity status post resection of melanoma patient grew Pseudomonas treated with Levaquin follow-up with plastic surgery as an outpatient    Pertinent Physical Exam At Time of Discharge  Physical Exam    Outpatient Follow-Up  No future appointments.      RAMAN Zabala MD

## 2024-03-14 ENCOUNTER — TELEPHONE (OUTPATIENT)
Dept: SURGICAL ONCOLOGY | Facility: HOSPITAL | Age: 89
End: 2024-03-14
Payer: COMMERCIAL

## 2024-03-14 NOTE — TELEPHONE ENCOUNTER
Pt's daughter called, she will contact [facility] and assure patient's Ultrasound surveillance is what is scheduled at [facility]. She will contact us once the correct test has been performed.

## 2024-03-14 NOTE — TELEPHONE ENCOUNTER
Patient's daughter called regarding 6 month CT/PET scan at [facility]; can be reached at [phone]